# Patient Record
Sex: FEMALE | Race: BLACK OR AFRICAN AMERICAN | NOT HISPANIC OR LATINO | URBAN - METROPOLITAN AREA
[De-identification: names, ages, dates, MRNs, and addresses within clinical notes are randomized per-mention and may not be internally consistent; named-entity substitution may affect disease eponyms.]

---

## 2017-09-01 ENCOUNTER — EMERGENCY (EMERGENCY)
Facility: HOSPITAL | Age: 22
LOS: 1 days | Discharge: HOME | End: 2017-09-01
Attending: EMERGENCY MEDICINE | Admitting: EMERGENCY MEDICINE
Payer: COMMERCIAL

## 2017-09-01 VITALS
RESPIRATION RATE: 16 BRPM | OXYGEN SATURATION: 100 % | DIASTOLIC BLOOD PRESSURE: 79 MMHG | TEMPERATURE: 98 F | HEART RATE: 76 BPM | SYSTOLIC BLOOD PRESSURE: 129 MMHG

## 2017-09-01 PROCEDURE — 99284 EMERGENCY DEPT VISIT MOD MDM: CPT
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
## 2018-06-24 ENCOUNTER — EMERGENCY (EMERGENCY)
Facility: HOSPITAL | Age: 23
LOS: 1 days | End: 2018-06-24
Attending: EMERGENCY MEDICINE
Payer: COMMERCIAL

## 2018-06-24 ENCOUNTER — EMERGENCY (EMERGENCY)
Facility: HOSPITAL | Age: 23
LOS: 1 days | Discharge: ANOTHER HOSPITAL | End: 2018-06-24
Attending: EMERGENCY MEDICINE | Admitting: EMERGENCY MEDICINE
Payer: COMMERCIAL

## 2018-06-24 VITALS
DIASTOLIC BLOOD PRESSURE: 75 MMHG | SYSTOLIC BLOOD PRESSURE: 125 MMHG | HEART RATE: 83 BPM | OXYGEN SATURATION: 99 % | RESPIRATION RATE: 18 BRPM | TEMPERATURE: 99 F

## 2018-06-24 VITALS
OXYGEN SATURATION: 100 % | HEART RATE: 113 BPM | DIASTOLIC BLOOD PRESSURE: 90 MMHG | RESPIRATION RATE: 20 BRPM | TEMPERATURE: 97 F | SYSTOLIC BLOOD PRESSURE: 120 MMHG

## 2018-06-24 VITALS
SYSTOLIC BLOOD PRESSURE: 136 MMHG | HEART RATE: 92 BPM | DIASTOLIC BLOOD PRESSURE: 79 MMHG | OXYGEN SATURATION: 98 % | TEMPERATURE: 98 F | RESPIRATION RATE: 20 BRPM

## 2018-06-24 VITALS
TEMPERATURE: 99 F | SYSTOLIC BLOOD PRESSURE: 130 MMHG | HEART RATE: 104 BPM | OXYGEN SATURATION: 100 % | DIASTOLIC BLOOD PRESSURE: 79 MMHG | RESPIRATION RATE: 15 BRPM

## 2018-06-24 LAB
ALBUMIN SERPL ELPH-MCNC: 3.9 G/DL — SIGNIFICANT CHANGE UP (ref 3.3–5)
ALBUMIN SERPL ELPH-MCNC: 4.1 G/DL — SIGNIFICANT CHANGE UP (ref 3.3–5)
ALBUMIN SERPL ELPH-MCNC: 4.8 G/DL — SIGNIFICANT CHANGE UP (ref 3.3–5)
ALP SERPL-CCNC: 63 U/L — SIGNIFICANT CHANGE UP (ref 40–120)
ALP SERPL-CCNC: 64 U/L — SIGNIFICANT CHANGE UP (ref 40–120)
ALP SERPL-CCNC: 71 U/L — SIGNIFICANT CHANGE UP (ref 40–120)
ALT FLD-CCNC: 10 U/L — SIGNIFICANT CHANGE UP (ref 4–33)
ALT FLD-CCNC: 10 U/L — SIGNIFICANT CHANGE UP (ref 4–33)
ALT FLD-CCNC: 8 U/L — LOW (ref 10–45)
AMPHET UR-MCNC: NEGATIVE — SIGNIFICANT CHANGE UP
ANION GAP SERPL CALC-SCNC: 16 MMOL/L — SIGNIFICANT CHANGE UP (ref 5–17)
APAP SERPL-MCNC: < 15 UG/ML — LOW (ref 15–25)
APPEARANCE UR: CLEAR — SIGNIFICANT CHANGE UP
AST SERPL-CCNC: 11 U/L — SIGNIFICANT CHANGE UP (ref 10–40)
AST SERPL-CCNC: 15 U/L — SIGNIFICANT CHANGE UP (ref 4–32)
AST SERPL-CCNC: 16 U/L — SIGNIFICANT CHANGE UP (ref 4–32)
BARBITURATES UR SCN-MCNC: NEGATIVE — SIGNIFICANT CHANGE UP
BASE EXCESS BLDV CALC-SCNC: -0.3 MMOL/L — SIGNIFICANT CHANGE UP (ref -2–2)
BASE EXCESS BLDV CALC-SCNC: 0.4 MMOL/L — SIGNIFICANT CHANGE UP
BASE EXCESS BLDV CALC-SCNC: 2.1 MMOL/L — SIGNIFICANT CHANGE UP
BASOPHILS # BLD AUTO: 0.04 K/UL — SIGNIFICANT CHANGE UP (ref 0–0.2)
BASOPHILS NFR BLD AUTO: 0.2 % — SIGNIFICANT CHANGE UP (ref 0–2)
BENZODIAZ UR-MCNC: NEGATIVE — SIGNIFICANT CHANGE UP
BILIRUB SERPL-MCNC: 0.4 MG/DL — SIGNIFICANT CHANGE UP (ref 0.2–1.2)
BILIRUB SERPL-MCNC: 0.4 MG/DL — SIGNIFICANT CHANGE UP (ref 0.2–1.2)
BILIRUB SERPL-MCNC: 0.7 MG/DL — SIGNIFICANT CHANGE UP (ref 0.2–1.2)
BILIRUB UR-MCNC: NEGATIVE — SIGNIFICANT CHANGE UP
BLOOD GAS VENOUS - CREATININE: 0.52 MG/DL — SIGNIFICANT CHANGE UP (ref 0.5–1.3)
BLOOD GAS VENOUS - CREATININE: 0.54 MG/DL — SIGNIFICANT CHANGE UP (ref 0.5–1.3)
BLOOD UR QL VISUAL: NEGATIVE — SIGNIFICANT CHANGE UP
BUN SERPL-MCNC: 5 MG/DL — LOW (ref 7–23)
BUN SERPL-MCNC: 6 MG/DL — LOW (ref 7–23)
BUN SERPL-MCNC: 6 MG/DL — LOW (ref 7–23)
CA-I SERPL-SCNC: 1.1 MMOL/L — LOW (ref 1.12–1.3)
CALCIUM SERPL-MCNC: 8.3 MG/DL — LOW (ref 8.4–10.5)
CALCIUM SERPL-MCNC: 8.4 MG/DL — SIGNIFICANT CHANGE UP (ref 8.4–10.5)
CALCIUM SERPL-MCNC: 9.7 MG/DL — SIGNIFICANT CHANGE UP (ref 8.4–10.5)
CANNABINOIDS UR-MCNC: POSITIVE — SIGNIFICANT CHANGE UP
CHLORIDE BLDV-SCNC: 109 MMOL/L — HIGH (ref 96–108)
CHLORIDE BLDV-SCNC: 111 MMOL/L — HIGH (ref 96–108)
CHLORIDE BLDV-SCNC: 112 MMOL/L — HIGH (ref 96–108)
CHLORIDE SERPL-SCNC: 103 MMOL/L — SIGNIFICANT CHANGE UP (ref 98–107)
CHLORIDE SERPL-SCNC: 106 MMOL/L — SIGNIFICANT CHANGE UP (ref 98–107)
CHLORIDE SERPL-SCNC: 107 MMOL/L — SIGNIFICANT CHANGE UP (ref 96–108)
CO2 BLDV-SCNC: 26 MMOL/L — SIGNIFICANT CHANGE UP (ref 22–30)
CO2 SERPL-SCNC: 20 MMOL/L — LOW (ref 22–31)
CO2 SERPL-SCNC: 21 MMOL/L — LOW (ref 22–31)
CO2 SERPL-SCNC: 23 MMOL/L — SIGNIFICANT CHANGE UP (ref 22–31)
COCAINE METAB.OTHER UR-MCNC: NEGATIVE — SIGNIFICANT CHANGE UP
COLOR SPEC: YELLOW — SIGNIFICANT CHANGE UP
CREAT SERPL-MCNC: 0.52 MG/DL — SIGNIFICANT CHANGE UP (ref 0.5–1.3)
CREAT SERPL-MCNC: 0.53 MG/DL — SIGNIFICANT CHANGE UP (ref 0.5–1.3)
CREAT SERPL-MCNC: 0.54 MG/DL — SIGNIFICANT CHANGE UP (ref 0.5–1.3)
EOSINOPHIL # BLD AUTO: 0 K/UL — SIGNIFICANT CHANGE UP (ref 0–0.5)
EOSINOPHIL NFR BLD AUTO: 0 % — SIGNIFICANT CHANGE UP (ref 0–6)
ETHANOL BLD-MCNC: 54 MG/DL — HIGH
GAS PNL BLDV: 139 MMOL/L — SIGNIFICANT CHANGE UP (ref 136–145)
GAS PNL BLDV: 140 MMOL/L — SIGNIFICANT CHANGE UP (ref 136–146)
GAS PNL BLDV: 146 MMOL/L — SIGNIFICANT CHANGE UP (ref 136–146)
GAS PNL BLDV: SIGNIFICANT CHANGE UP
GAS PNL BLDV: SIGNIFICANT CHANGE UP
GLUCOSE BLDV-MCNC: 109 — HIGH (ref 70–99)
GLUCOSE BLDV-MCNC: 116 — HIGH (ref 70–99)
GLUCOSE BLDV-MCNC: 90 MG/DL — SIGNIFICANT CHANGE UP (ref 70–99)
GLUCOSE SERPL-MCNC: 109 MG/DL — HIGH (ref 70–99)
GLUCOSE SERPL-MCNC: 117 MG/DL — HIGH (ref 70–99)
GLUCOSE SERPL-MCNC: 97 MG/DL — SIGNIFICANT CHANGE UP (ref 70–99)
GLUCOSE UR-MCNC: NEGATIVE — SIGNIFICANT CHANGE UP
HBV SURFACE AG SER-ACNC: NEGATIVE — SIGNIFICANT CHANGE UP
HCG SERPL-ACNC: < 5 MIU/ML — SIGNIFICANT CHANGE UP
HCO3 BLDV-SCNC: 23 MMOL/L — SIGNIFICANT CHANGE UP (ref 20–27)
HCO3 BLDV-SCNC: 25 MMOL/L — SIGNIFICANT CHANGE UP (ref 20–27)
HCO3 BLDV-SCNC: 25 MMOL/L — SIGNIFICANT CHANGE UP (ref 21–29)
HCT VFR BLD CALC: 36.2 % — SIGNIFICANT CHANGE UP (ref 34.5–45)
HCT VFR BLD CALC: 39.7 % — SIGNIFICANT CHANGE UP (ref 34.5–45)
HCT VFR BLDA CALC: 36 % — LOW (ref 39–50)
HCT VFR BLDV CALC: 39.3 % — SIGNIFICANT CHANGE UP (ref 34.5–45)
HCT VFR BLDV CALC: 57 % — CRITICAL HIGH (ref 34.5–45)
HGB BLD CALC-MCNC: 11.8 G/DL — SIGNIFICANT CHANGE UP (ref 11.5–15.5)
HGB BLD-MCNC: 12.3 G/DL — SIGNIFICANT CHANGE UP (ref 11.5–15.5)
HGB BLD-MCNC: 13.7 G/DL — SIGNIFICANT CHANGE UP (ref 11.5–15.5)
HGB BLDV-MCNC: 12.8 G/DL — SIGNIFICANT CHANGE UP (ref 11.5–15.5)
HGB BLDV-MCNC: 18.7 G/DL — HIGH (ref 11.5–15.5)
HIV COMBO RESULT: SIGNIFICANT CHANGE UP
HIV1+2 AB SPEC QL: SIGNIFICANT CHANGE UP
IMM GRANULOCYTES # BLD AUTO: 0.13 # — SIGNIFICANT CHANGE UP
IMM GRANULOCYTES NFR BLD AUTO: 0.6 % — SIGNIFICANT CHANGE UP (ref 0–1.5)
KETONES UR-MCNC: SIGNIFICANT CHANGE UP
LACTATE BLDV-MCNC: 1.7 MMOL/L — SIGNIFICANT CHANGE UP (ref 0.5–2)
LACTATE BLDV-MCNC: 1.7 MMOL/L — SIGNIFICANT CHANGE UP (ref 0.7–2)
LACTATE BLDV-MCNC: 4.7 MMOL/L — CRITICAL HIGH (ref 0.5–2)
LEUKOCYTE ESTERASE UR-ACNC: NEGATIVE — SIGNIFICANT CHANGE UP
LIDOCAIN IGE QN: 21.8 U/L — SIGNIFICANT CHANGE UP (ref 7–60)
LIDOCAIN IGE QN: 33.6 U/L — SIGNIFICANT CHANGE UP (ref 7–60)
LYMPHOCYTES # BLD AUTO: 1.76 K/UL — SIGNIFICANT CHANGE UP (ref 1–3.3)
LYMPHOCYTES # BLD AUTO: 7.9 % — LOW (ref 13–44)
MCHC RBC-ENTMCNC: 29.5 PG — SIGNIFICANT CHANGE UP (ref 27–34)
MCHC RBC-ENTMCNC: 31.1 PG — SIGNIFICANT CHANGE UP (ref 27–34)
MCHC RBC-ENTMCNC: 34 GM/DL — SIGNIFICANT CHANGE UP (ref 32–36)
MCHC RBC-ENTMCNC: 34.5 % — SIGNIFICANT CHANGE UP (ref 32–36)
MCV RBC AUTO: 85.6 FL — SIGNIFICANT CHANGE UP (ref 80–100)
MCV RBC AUTO: 91.5 FL — SIGNIFICANT CHANGE UP (ref 80–100)
METHADONE UR-MCNC: NEGATIVE — SIGNIFICANT CHANGE UP
MONOCYTES # BLD AUTO: 0.65 K/UL — SIGNIFICANT CHANGE UP (ref 0–0.9)
MONOCYTES NFR BLD AUTO: 2.9 % — SIGNIFICANT CHANGE UP (ref 2–14)
MUCOUS THREADS # UR AUTO: SIGNIFICANT CHANGE UP
NEUTROPHILS # BLD AUTO: 19.76 K/UL — HIGH (ref 1.8–7.4)
NEUTROPHILS NFR BLD AUTO: 88.4 % — HIGH (ref 43–77)
NITRITE UR-MCNC: NEGATIVE — SIGNIFICANT CHANGE UP
NRBC # FLD: 0 — SIGNIFICANT CHANGE UP
OPIATES UR-MCNC: NEGATIVE — SIGNIFICANT CHANGE UP
OXYCODONE UR-MCNC: NEGATIVE — SIGNIFICANT CHANGE UP
PCO2 BLDV: 38 MMHG — LOW (ref 41–51)
PCO2 BLDV: 44 MMHG — SIGNIFICANT CHANGE UP (ref 35–50)
PCO2 BLDV: 46 MMHG — SIGNIFICANT CHANGE UP (ref 41–51)
PCP UR-MCNC: NEGATIVE — SIGNIFICANT CHANGE UP
PH BLDV: 7.36 PH — SIGNIFICANT CHANGE UP (ref 7.32–7.43)
PH BLDV: 7.36 — SIGNIFICANT CHANGE UP (ref 7.35–7.45)
PH BLDV: 7.45 PH — HIGH (ref 7.32–7.43)
PH UR: 8 — SIGNIFICANT CHANGE UP (ref 4.6–8)
PLATELET # BLD AUTO: 374 K/UL — SIGNIFICANT CHANGE UP (ref 150–400)
PLATELET # BLD AUTO: 484 K/UL — HIGH (ref 150–400)
PMV BLD: 8.9 FL — SIGNIFICANT CHANGE UP (ref 7–13)
PO2 BLDV: 25 MMHG — LOW (ref 35–40)
PO2 BLDV: 28 MMHG — LOW (ref 35–40)
PO2 BLDV: 42 MMHG — SIGNIFICANT CHANGE UP (ref 25–45)
POTASSIUM BLDV-SCNC: 3.1 MMOL/L — LOW (ref 3.4–4.5)
POTASSIUM BLDV-SCNC: 3.3 MMOL/L — LOW (ref 3.5–5.3)
POTASSIUM BLDV-SCNC: 3.7 MMOL/L — SIGNIFICANT CHANGE UP (ref 3.4–4.5)
POTASSIUM SERPL-MCNC: 3.2 MMOL/L — LOW (ref 3.5–5.3)
POTASSIUM SERPL-MCNC: 3.5 MMOL/L — SIGNIFICANT CHANGE UP (ref 3.5–5.3)
POTASSIUM SERPL-MCNC: 3.8 MMOL/L — SIGNIFICANT CHANGE UP (ref 3.5–5.3)
POTASSIUM SERPL-SCNC: 3.2 MMOL/L — LOW (ref 3.5–5.3)
POTASSIUM SERPL-SCNC: 3.5 MMOL/L — SIGNIFICANT CHANGE UP (ref 3.5–5.3)
POTASSIUM SERPL-SCNC: 3.8 MMOL/L — SIGNIFICANT CHANGE UP (ref 3.5–5.3)
PROT SERPL-MCNC: 6.8 G/DL — SIGNIFICANT CHANGE UP (ref 6–8.3)
PROT SERPL-MCNC: 7.4 G/DL — SIGNIFICANT CHANGE UP (ref 6–8.3)
PROT SERPL-MCNC: 8.5 G/DL — HIGH (ref 6–8.3)
PROT UR-MCNC: 100 MG/DL — HIGH
RBC # BLD: 3.96 M/UL — SIGNIFICANT CHANGE UP (ref 3.8–5.2)
RBC # BLD: 4.64 M/UL — SIGNIFICANT CHANGE UP (ref 3.8–5.2)
RBC # FLD: 11.6 % — SIGNIFICANT CHANGE UP (ref 10.3–14.5)
RBC # FLD: 12.8 % — SIGNIFICANT CHANGE UP (ref 10.3–14.5)
RBC CASTS # UR COMP ASSIST: SIGNIFICANT CHANGE UP (ref 0–?)
SALICYLATES SERPL-MCNC: < 5 MG/DL — LOW (ref 15–30)
SAO2 % BLDV: 37.9 % — LOW (ref 60–85)
SAO2 % BLDV: 42.2 % — LOW (ref 60–85)
SAO2 % BLDV: 70 % — SIGNIFICANT CHANGE UP (ref 67–88)
SODIUM SERPL-SCNC: 142 MMOL/L — SIGNIFICANT CHANGE UP (ref 135–145)
SODIUM SERPL-SCNC: 143 MMOL/L — SIGNIFICANT CHANGE UP (ref 135–145)
SODIUM SERPL-SCNC: 146 MMOL/L — HIGH (ref 135–145)
SP GR SPEC: 1.03 — SIGNIFICANT CHANGE UP (ref 1–1.04)
SQUAMOUS # UR AUTO: SIGNIFICANT CHANGE UP
UROBILINOGEN FLD QL: NORMAL MG/DL — SIGNIFICANT CHANGE UP
WBC # BLD: 22.34 K/UL — HIGH (ref 3.8–10.5)
WBC # BLD: 22.8 K/UL — HIGH (ref 3.8–10.5)
WBC # FLD AUTO: 22.34 K/UL — HIGH (ref 3.8–10.5)
WBC # FLD AUTO: 22.8 K/UL — HIGH (ref 3.8–10.5)
WBC UR QL: SIGNIFICANT CHANGE UP (ref 0–?)

## 2018-06-24 PROCEDURE — 99285 EMERGENCY DEPT VISIT HI MDM: CPT

## 2018-06-24 PROCEDURE — 99284 EMERGENCY DEPT VISIT MOD MDM: CPT

## 2018-06-24 PROCEDURE — 70486 CT MAXILLOFACIAL W/O DYE: CPT | Mod: 26

## 2018-06-24 PROCEDURE — 70450 CT HEAD/BRAIN W/O DYE: CPT | Mod: 26

## 2018-06-24 RX ORDER — EMTRICITABINE AND TENOFOVIR DISOPROXIL FUMARATE 200; 300 MG/1; MG/1
1 TABLET, FILM COATED ORAL ONCE
Qty: 0 | Refills: 0 | Status: COMPLETED | OUTPATIENT
Start: 2018-06-24 | End: 2018-06-24

## 2018-06-24 RX ORDER — CEFTRIAXONE 500 MG/1
250 INJECTION, POWDER, FOR SOLUTION INTRAMUSCULAR; INTRAVENOUS ONCE
Qty: 0 | Refills: 0 | Status: COMPLETED | OUTPATIENT
Start: 2018-06-24 | End: 2018-06-24

## 2018-06-24 RX ORDER — FAMOTIDINE 10 MG/ML
20 INJECTION INTRAVENOUS ONCE
Qty: 0 | Refills: 0 | Status: COMPLETED | OUTPATIENT
Start: 2018-06-24 | End: 2018-06-24

## 2018-06-24 RX ORDER — SODIUM CHLORIDE 9 MG/ML
2000 INJECTION INTRAMUSCULAR; INTRAVENOUS; SUBCUTANEOUS ONCE
Qty: 0 | Refills: 0 | Status: COMPLETED | OUTPATIENT
Start: 2018-06-24 | End: 2018-06-24

## 2018-06-24 RX ORDER — ACETAMINOPHEN 500 MG
1000 TABLET ORAL ONCE
Qty: 0 | Refills: 0 | Status: COMPLETED | OUTPATIENT
Start: 2018-06-24 | End: 2018-06-24

## 2018-06-24 RX ORDER — RALTEGRAVIR 400 MG/1
400 TABLET, FILM COATED ORAL ONCE
Qty: 0 | Refills: 0 | Status: COMPLETED | OUTPATIENT
Start: 2018-06-24 | End: 2018-06-24

## 2018-06-24 RX ORDER — ONDANSETRON 8 MG/1
4 TABLET, FILM COATED ORAL ONCE
Qty: 0 | Refills: 0 | Status: COMPLETED | OUTPATIENT
Start: 2018-06-24 | End: 2018-06-24

## 2018-06-24 RX ORDER — METRONIDAZOLE 500 MG
500 TABLET ORAL ONCE
Qty: 0 | Refills: 0 | Status: COMPLETED | OUTPATIENT
Start: 2018-06-24 | End: 2018-06-24

## 2018-06-24 RX ORDER — KETOROLAC TROMETHAMINE 30 MG/ML
15 SYRINGE (ML) INJECTION ONCE
Qty: 0 | Refills: 0 | Status: DISCONTINUED | OUTPATIENT
Start: 2018-06-24 | End: 2018-06-24

## 2018-06-24 RX ORDER — METRONIDAZOLE 500 MG
1500 TABLET ORAL ONCE
Qty: 0 | Refills: 0 | Status: DISCONTINUED | OUTPATIENT
Start: 2018-06-24 | End: 2018-06-28

## 2018-06-24 RX ORDER — SODIUM CHLORIDE 9 MG/ML
1000 INJECTION INTRAMUSCULAR; INTRAVENOUS; SUBCUTANEOUS ONCE
Qty: 0 | Refills: 0 | Status: COMPLETED | OUTPATIENT
Start: 2018-06-24 | End: 2018-06-24

## 2018-06-24 RX ORDER — TETANUS TOXOID, REDUCED DIPHTHERIA TOXOID AND ACELLULAR PERTUSSIS VACCINE, ADSORBED 5; 2.5; 8; 8; 2.5 [IU]/.5ML; [IU]/.5ML; UG/.5ML; UG/.5ML; UG/.5ML
0.5 SUSPENSION INTRAMUSCULAR ONCE
Qty: 0 | Refills: 0 | Status: COMPLETED | OUTPATIENT
Start: 2018-06-24 | End: 2018-06-24

## 2018-06-24 RX ADMIN — FAMOTIDINE 20 MILLIGRAM(S): 10 INJECTION INTRAVENOUS at 15:53

## 2018-06-24 RX ADMIN — Medication 400 MILLIGRAM(S): at 17:21

## 2018-06-24 RX ADMIN — CEFTRIAXONE 250 MILLIGRAM(S): 500 INJECTION, POWDER, FOR SOLUTION INTRAMUSCULAR; INTRAVENOUS at 18:33

## 2018-06-24 RX ADMIN — Medication 100 MILLIGRAM(S): at 18:34

## 2018-06-24 RX ADMIN — ONDANSETRON 4 MILLIGRAM(S): 8 TABLET, FILM COATED ORAL at 15:53

## 2018-06-24 RX ADMIN — TETANUS TOXOID, REDUCED DIPHTHERIA TOXOID AND ACELLULAR PERTUSSIS VACCINE, ADSORBED 0.5 MILLILITER(S): 5; 2.5; 8; 8; 2.5 SUSPENSION INTRAMUSCULAR at 18:33

## 2018-06-24 RX ADMIN — RALTEGRAVIR 400 MILLIGRAM(S): 400 TABLET, FILM COATED ORAL at 18:35

## 2018-06-24 RX ADMIN — SODIUM CHLORIDE 2000 MILLILITER(S): 9 INJECTION INTRAMUSCULAR; INTRAVENOUS; SUBCUTANEOUS at 18:35

## 2018-06-24 RX ADMIN — Medication 500 MILLIGRAM(S): at 18:35

## 2018-06-24 RX ADMIN — Medication 1000 MILLIGRAM(S): at 17:30

## 2018-06-24 RX ADMIN — SODIUM CHLORIDE 1000 MILLILITER(S): 9 INJECTION INTRAMUSCULAR; INTRAVENOUS; SUBCUTANEOUS at 15:53

## 2018-06-24 RX ADMIN — EMTRICITABINE AND TENOFOVIR DISOPROXIL FUMARATE 1 TABLET(S): 200; 300 TABLET, FILM COATED ORAL at 18:35
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
## 2018-06-24 NOTE — ED ADULT NURSE REASSESSMENT NOTE - NS ED NURSE REASSESS COMMENT FT1
Report given to PENG Mcmanus and charge RN in the ED at [facility]. Pt awaiting transport to [facility]. Will continue to monitor.
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
## 2018-06-24 NOTE — ED PROVIDER NOTE - SKIN COLOR
R lateral periorbital hematoma with swelling and overlying abrasion. superficial abrasion overlying right knee/normal for race
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
## 2018-06-24 NOTE — ED ADULT NURSE REASSESSMENT NOTE - NS ED NURSE REASSESS COMMENT FT1
Pt A&Ax3, breathing unlabored, pain controlled at this time. Pt given 7day PEP HIV medication and instructions. First dose administered here in ED. Awaiting transfer to [facility].
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
## 2018-06-24 NOTE — ED PROVIDER NOTE - LOCATION
hematoma/abrasion at lateral right eye. Abrasion to upper lip and R knee. R front tooth missing. Generalized headache and epigastric discomfort, n/v
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
## 2018-06-25 LAB
C TRACH RRNA SPEC QL NAA+PROBE: SIGNIFICANT CHANGE UP
HAV IGM SER-ACNC: NONREACTIVE — SIGNIFICANT CHANGE UP
HBV CORE IGM SER-ACNC: NONREACTIVE — SIGNIFICANT CHANGE UP
HBV SURFACE AB SER-ACNC: REACTIVE — SIGNIFICANT CHANGE UP
HBV SURFACE AG SER-ACNC: NONREACTIVE — SIGNIFICANT CHANGE UP
HCV AB S/CO SERPL IA: 0.13 S/CO — SIGNIFICANT CHANGE UP
HCV AB SERPL-IMP: SIGNIFICANT CHANGE UP
N GONORRHOEA RRNA SPEC QL NAA+PROBE: SIGNIFICANT CHANGE UP
SPECIMEN SOURCE: SIGNIFICANT CHANGE UP
T PALLIDUM AB TITR SER: NEGATIVE — SIGNIFICANT CHANGE UP

## 2018-06-25 PROCEDURE — 82803 BLOOD GASES ANY COMBINATION: CPT

## 2018-06-25 PROCEDURE — 96374 THER/PROPH/DIAG INJ IV PUSH: CPT

## 2018-06-25 PROCEDURE — 80053 COMPREHEN METABOLIC PANEL: CPT

## 2018-06-25 PROCEDURE — 82435 ASSAY OF BLOOD CHLORIDE: CPT

## 2018-06-25 PROCEDURE — 82330 ASSAY OF CALCIUM: CPT

## 2018-06-25 PROCEDURE — 84132 ASSAY OF SERUM POTASSIUM: CPT

## 2018-06-25 PROCEDURE — 82947 ASSAY GLUCOSE BLOOD QUANT: CPT

## 2018-06-25 PROCEDURE — 99284 EMERGENCY DEPT VISIT MOD MDM: CPT | Mod: 25

## 2018-06-25 PROCEDURE — 85027 COMPLETE CBC AUTOMATED: CPT

## 2018-06-25 PROCEDURE — 85014 HEMATOCRIT: CPT

## 2018-06-25 PROCEDURE — 84295 ASSAY OF SERUM SODIUM: CPT

## 2018-06-25 PROCEDURE — 83605 ASSAY OF LACTIC ACID: CPT

## 2018-06-25 RX ADMIN — Medication 15 MILLIGRAM(S): at 00:38

## 2018-07-02 ENCOUNTER — APPOINTMENT (OUTPATIENT)
Dept: PEDIATRIC ALLERGY IMMUNOLOGY | Facility: CLINIC | Age: 23
End: 2018-07-02
Payer: COMMERCIAL

## 2018-07-02 VITALS
DIASTOLIC BLOOD PRESSURE: 80 MMHG | HEART RATE: 76 BPM | HEIGHT: 59 IN | OXYGEN SATURATION: 99 % | BODY MASS INDEX: 23.21 KG/M2 | SYSTOLIC BLOOD PRESSURE: 120 MMHG | WEIGHT: 115.13 LBS

## 2018-07-02 DIAGNOSIS — Z82.49 FAMILY HISTORY OF ISCHEMIC HEART DISEASE AND OTHER DISEASES OF THE CIRCULATORY SYSTEM: ICD-10-CM

## 2018-07-02 DIAGNOSIS — Z82.5 FAMILY HISTORY OF ASTHMA AND OTHER CHRONIC LOWER RESPIRATORY DISEASES: ICD-10-CM

## 2018-07-02 DIAGNOSIS — Z82.69 FAMILY HISTORY OF OTHER DISEASES OF THE MUSCULOSKELETAL SYSTEM AND CONNECTIVE TISSUE: ICD-10-CM

## 2018-07-02 DIAGNOSIS — Z84.89 FAMILY HISTORY OF OTHER SPECIFIED CONDITIONS: ICD-10-CM

## 2018-07-02 DIAGNOSIS — Z84.2 FAMILY HISTORY OF OTHER DISEASES OF THE GENITOURINARY SYSTEM: ICD-10-CM

## 2018-07-02 PROCEDURE — 99205 OFFICE O/P NEW HI 60 MIN: CPT

## 2018-08-08 ENCOUNTER — APPOINTMENT (OUTPATIENT)
Dept: OBGYN | Facility: CLINIC | Age: 23
End: 2018-08-08
Payer: COMMERCIAL

## 2018-08-08 VITALS
HEIGHT: 59 IN | BODY MASS INDEX: 22.98 KG/M2 | SYSTOLIC BLOOD PRESSURE: 117 MMHG | WEIGHT: 114 LBS | DIASTOLIC BLOOD PRESSURE: 77 MMHG

## 2018-08-08 PROCEDURE — 99395 PREV VISIT EST AGE 18-39: CPT

## 2018-08-10 LAB
C TRACH RRNA SPEC QL NAA+PROBE: NOT DETECTED
CANDIDA VAG CYTO: DETECTED
G VAGINALIS+PREV SP MTYP VAG QL MICRO: DETECTED
N GONORRHOEA RRNA SPEC QL NAA+PROBE: NOT DETECTED
SOURCE AMPLIFICATION: NORMAL
T VAGINALIS VAG QL WET PREP: NOT DETECTED

## 2018-08-14 LAB — CYTOLOGY CVX/VAG DOC THIN PREP: NORMAL

## 2018-12-11 ENCOUNTER — APPOINTMENT (OUTPATIENT)
Dept: OBGYN | Facility: CLINIC | Age: 23
End: 2018-12-11
Payer: COMMERCIAL

## 2018-12-11 VITALS
SYSTOLIC BLOOD PRESSURE: 108 MMHG | DIASTOLIC BLOOD PRESSURE: 70 MMHG | WEIGHT: 114 LBS | HEIGHT: 59 IN | BODY MASS INDEX: 22.98 KG/M2

## 2018-12-11 DIAGNOSIS — N76.1 SUBACUTE AND CHRONIC VAGINITIS: ICD-10-CM

## 2018-12-11 PROCEDURE — 99214 OFFICE O/P EST MOD 30 MIN: CPT

## 2018-12-11 RX ORDER — FLUCONAZOLE 150 MG/1
150 TABLET ORAL
Qty: 1 | Refills: 1 | Status: COMPLETED | COMMUNITY
Start: 2018-08-10 | End: 2018-12-11

## 2018-12-11 RX ORDER — RALTEGRAVIR 400 MG/1
400 TABLET, FILM COATED ORAL TWICE DAILY
Qty: 42 | Refills: 0 | Status: COMPLETED | COMMUNITY
Start: 2018-07-02 | End: 2018-12-11

## 2018-12-11 RX ORDER — EMTRICITABINE AND TENOFOVIR DISOPROXIL FUMARATE 200; 300 MG/1; MG/1
200-300 TABLET, FILM COATED ORAL
Qty: 1 | Refills: 0 | Status: COMPLETED | COMMUNITY
Start: 2018-07-02 | End: 2018-12-11

## 2018-12-16 LAB
C TRACH RRNA SPEC QL NAA+PROBE: DETECTED
CYTOLOGY CVX/VAG DOC THIN PREP: NORMAL
N GONORRHOEA RRNA SPEC QL NAA+PROBE: NOT DETECTED
SOURCE AMPLIFICATION: NORMAL

## 2018-12-18 ENCOUNTER — CHART COPY (OUTPATIENT)
Age: 23
End: 2018-12-18

## 2018-12-18 LAB
HBV SURFACE AG SER QL: NONREACTIVE
HCV AB SER QL: NONREACTIVE
HCV S/CO RATIO: 0.1 S/CO
HIV1+2 AB SPEC QL IA.RAPID: NONREACTIVE
T PALLIDUM AB SER QL IA: NEGATIVE

## 2019-01-08 ENCOUNTER — APPOINTMENT (OUTPATIENT)
Dept: OBGYN | Facility: CLINIC | Age: 24
End: 2019-01-08
Payer: COMMERCIAL

## 2019-01-08 VITALS
BODY MASS INDEX: 22.6 KG/M2 | WEIGHT: 112.13 LBS | SYSTOLIC BLOOD PRESSURE: 123 MMHG | HEIGHT: 59 IN | HEART RATE: 92 BPM | DIASTOLIC BLOOD PRESSURE: 78 MMHG

## 2019-01-08 DIAGNOSIS — A56.09 CHLAMYDIAL VULVOVAGINITIS: ICD-10-CM

## 2019-01-08 DIAGNOSIS — A56.02 CHLAMYDIAL VULVOVAGINITIS: ICD-10-CM

## 2019-01-08 PROCEDURE — 11981 INSERTION DRUG DLVR IMPLANT: CPT

## 2019-01-08 PROCEDURE — 99212 OFFICE O/P EST SF 10 MIN: CPT

## 2019-01-08 RX ORDER — AZITHROMYCIN 500 MG/1
500 TABLET, FILM COATED ORAL
Qty: 2 | Refills: 1 | Status: COMPLETED | COMMUNITY
Start: 2018-12-13 | End: 2019-01-08

## 2019-01-09 LAB
C TRACH RRNA SPEC QL NAA+PROBE: NOT DETECTED
N GONORRHOEA RRNA SPEC QL NAA+PROBE: NOT DETECTED
SOURCE AMPLIFICATION: NORMAL

## 2019-02-12 ENCOUNTER — APPOINTMENT (OUTPATIENT)
Dept: OBGYN | Facility: CLINIC | Age: 24
End: 2019-02-12
Payer: COMMERCIAL

## 2019-02-12 VITALS
WEIGHT: 113.38 LBS | HEIGHT: 59 IN | SYSTOLIC BLOOD PRESSURE: 90 MMHG | BODY MASS INDEX: 22.86 KG/M2 | DIASTOLIC BLOOD PRESSURE: 70 MMHG

## 2019-02-12 PROCEDURE — 99213 OFFICE O/P EST LOW 20 MIN: CPT

## 2019-02-12 NOTE — COUNSELING
[STD (testing, results, tx)] : STD (testing, results, tx) [Contraception] : contraception [Safe Sexual Practices] : safe sexual practices

## 2019-02-12 NOTE — PHYSICAL EXAM
[Bluish Discoloration] : did not have a bluish discoloration [Awake] : awake [Alert] : alert [Soft] : soft [Oriented x3] : oriented to person, place, and time [Normal] : uterus [No Bleeding] : there was no active vaginal bleeding [Uterine Adnexae] : were not tender and not enlarged [Acute Distress] : no acute distress [Tender] : non tender [Discharge] : a  ~M vaginal discharge was present [Moderate] : moderate [Shahla] : yellow

## 2019-02-13 ENCOUNTER — TRANSCRIPTION ENCOUNTER (OUTPATIENT)
Age: 24
End: 2019-02-13

## 2019-02-14 ENCOUNTER — OTHER (OUTPATIENT)
Age: 24
End: 2019-02-14

## 2019-02-19 LAB
CANDIDA VAG CYTO: NOT DETECTED
G VAGINALIS+PREV SP MTYP VAG QL MICRO: DETECTED
T VAGINALIS VAG QL WET PREP: NOT DETECTED

## 2019-03-01 ENCOUNTER — TRANSCRIPTION ENCOUNTER (OUTPATIENT)
Age: 24
End: 2019-03-01

## 2019-03-06 ENCOUNTER — TRANSCRIPTION ENCOUNTER (OUTPATIENT)
Age: 24
End: 2019-03-06

## 2019-03-07 ENCOUNTER — APPOINTMENT (OUTPATIENT)
Dept: OBGYN | Facility: CLINIC | Age: 24
End: 2019-03-07
Payer: COMMERCIAL

## 2019-03-07 VITALS
DIASTOLIC BLOOD PRESSURE: 70 MMHG | SYSTOLIC BLOOD PRESSURE: 100 MMHG | BODY MASS INDEX: 22.6 KG/M2 | HEIGHT: 59 IN | WEIGHT: 112.13 LBS

## 2019-03-07 PROCEDURE — 99214 OFFICE O/P EST MOD 30 MIN: CPT

## 2019-03-07 NOTE — PHYSICAL EXAM
[Awake] : awake [Alert] : alert [Acute Distress] : no acute distress [Soft] : soft [Tender] : non tender [Oriented x3] : oriented to person, place, and time [Normal] : uterus [Discharge] : a  ~M vaginal discharge was present [Scant] : there was scant vaginal bleeding [Uterine Adnexae] : were not tender and not enlarged [FreeTextEntry4] : small white discharge adherent to side wall

## 2021-01-19 ENCOUNTER — TRANSCRIPTION ENCOUNTER (OUTPATIENT)
Age: 26
End: 2021-01-19

## 2022-02-07 ENCOUNTER — NON-APPOINTMENT (OUTPATIENT)
Age: 27
End: 2022-02-07

## 2022-02-25 NOTE — ED PROVIDER NOTE - DISPOSITION TYPE
DISCHARGE
no diplopia/no photophobia/no lacrimation L/no lacrimation R/no blurred vision L/no blurred vision R/no discharge L/no discharge R/no pain L/no pain R/no irritation L/no irritation R/no scleral injection L/no scleral injection R

## 2022-03-10 ENCOUNTER — APPOINTMENT (OUTPATIENT)
Dept: OBGYN | Facility: CLINIC | Age: 27
End: 2022-03-10
Payer: COMMERCIAL

## 2022-03-10 ENCOUNTER — LABORATORY RESULT (OUTPATIENT)
Age: 27
End: 2022-03-10

## 2022-03-10 VITALS — SYSTOLIC BLOOD PRESSURE: 117 MMHG | DIASTOLIC BLOOD PRESSURE: 77 MMHG | BODY MASS INDEX: 23.03 KG/M2 | WEIGHT: 114 LBS

## 2022-03-10 PROCEDURE — 99395 PREV VISIT EST AGE 18-39: CPT

## 2022-03-10 RX ORDER — PRENATAL NO.167/FOLIC ACID/DHA 0.4MG-25MG
0.4-25 TABLET,CHEWABLE ORAL
Refills: 0 | Status: ACTIVE | COMMUNITY

## 2022-03-10 NOTE — HISTORY OF PRESENT ILLNESS
[FreeTextEntry1] : 25yo P0 LMP 1/2 here for annual exam and missed menses\par nausea and breast tenderness\par no pelvic pain though occ cramp\par \par new BF\par \par working at [employer] as manager

## 2022-03-10 NOTE — COUNSELING
[Nutrition/ Exercise/ Weight Management] : nutrition, exercise, weight management [Body Image] : body image [Vitamins/Supplements] : vitamins/supplements [Drugs/Alcohol] : drugs, alcohol [STD (testing, results, tx)] : STD (testing, results, tx) [Vaccines] : vaccines [Influenza Vaccine] : influenza vaccine

## 2022-03-10 NOTE — PLAN
[FreeTextEntry1] : well woman\par \par 9 week pregnancy, EDC  10/9\par \par f/u  pap\par \par discussed  risk of preg, vaccines, delivery, diet, activity\par \par PN labs\par \par discussed NIPT and DS screening\par STD screen\par \par s/p covide vaccine, discussed flu vaccination

## 2022-03-10 NOTE — PROCEDURE
[Transvaginal OB Sonogram] : Transvaginal OB Sonogram [Intrauterine Pregnancy] : intrauterine pregnancy [Fetal Heart] : fetal heart present [Current GA by Sonogram: ___ (wks)] : Current GA by Sonogram: [unfilled]Uwks [___ day(s)] : [unfilled] days [FreeTextEntry1] : EDC 10/9 by LMP and sono

## 2022-03-10 NOTE — PHYSICAL EXAM

## 2022-03-11 ENCOUNTER — NON-APPOINTMENT (OUTPATIENT)
Age: 27
End: 2022-03-11

## 2022-03-11 LAB
ABO + RH PNL BLD: NORMAL
ALBUMIN SERPL ELPH-MCNC: 3.7 G/DL
ALP BLD-CCNC: 60 U/L
ALT SERPL-CCNC: 37 U/L
ANION GAP SERPL CALC-SCNC: 14 MMOL/L
AST SERPL-CCNC: 23 U/L
BASOPHILS # BLD AUTO: 0.05 K/UL
BASOPHILS NFR BLD AUTO: 0.4 %
BILIRUB SERPL-MCNC: 0.2 MG/DL
BLD GP AB SCN SERPL QL: NORMAL
BUN SERPL-MCNC: 8 MG/DL
C TRACH RRNA SPEC QL NAA+PROBE: NOT DETECTED
CALCIUM SERPL-MCNC: 9.2 MG/DL
CHLORIDE SERPL-SCNC: 102 MMOL/L
CMV IGG SERPL QL: 3.5 U/ML
CMV IGG SERPL-IMP: POSITIVE
CO2 SERPL-SCNC: 19 MMOL/L
CREAT SERPL-MCNC: 0.56 MG/DL
EGFR: 127 ML/MIN/1.73M2
EOSINOPHIL # BLD AUTO: 0.27 K/UL
EOSINOPHIL NFR BLD AUTO: 2.1 %
ESTIMATED AVERAGE GLUCOSE: 91 MG/DL
FERRITIN SERPL-MCNC: 171 NG/ML
GLUCOSE SERPL-MCNC: 79 MG/DL
HBA1C MFR BLD HPLC: 4.8 %
HBV SURFACE AG SER QL: NONREACTIVE
HCT VFR BLD CALC: 37.6 %
HCV AB SER QL: NONREACTIVE
HCV S/CO RATIO: 0.08 S/CO
HGB A MFR BLD: 96.9 %
HGB A2 MFR BLD: 3.1 %
HGB BLD-MCNC: 12.1 G/DL
HGB FRACT BLD-IMP: NORMAL
HIV1+2 AB SPEC QL IA.RAPID: NONREACTIVE
IMM GRANULOCYTES NFR BLD AUTO: 0.4 %
LEAD BLD-MCNC: <1 UG/DL
LYMPHOCYTES # BLD AUTO: 2.37 K/UL
LYMPHOCYTES NFR BLD AUTO: 18.4 %
MAN DIFF?: NORMAL
MCHC RBC-ENTMCNC: 30 PG
MCHC RBC-ENTMCNC: 32.2 GM/DL
MCV RBC AUTO: 93.3 FL
MEV IGG FLD QL IA: <5 AU/ML
MEV IGG+IGM SER-IMP: NEGATIVE
MONOCYTES # BLD AUTO: 0.82 K/UL
MONOCYTES NFR BLD AUTO: 6.4 %
N GONORRHOEA RRNA SPEC QL NAA+PROBE: NOT DETECTED
NEUTROPHILS # BLD AUTO: 9.34 K/UL
NEUTROPHILS NFR BLD AUTO: 72.3 %
PLATELET # BLD AUTO: 420 K/UL
POTASSIUM SERPL-SCNC: 4.1 MMOL/L
PROT SERPL-MCNC: 6.4 G/DL
RBC # BLD: 4.03 M/UL
RBC # FLD: 12.8 %
RUBV IGG FLD-ACNC: 12.1 INDEX
RUBV IGG SER-IMP: POSITIVE
SODIUM SERPL-SCNC: 136 MMOL/L
SOURCE AMPLIFICATION: NORMAL
T PALLIDUM AB SER QL IA: NEGATIVE
TSH SERPL-ACNC: 0.67 UIU/ML
VZV AB TITR SER: POSITIVE
VZV IGG SER IF-ACNC: 902.4 INDEX
WBC # FLD AUTO: 12.9 K/UL

## 2022-03-11 RX ORDER — METRONIDAZOLE 7.5 MG/G
0.75 GEL VAGINAL
Qty: 1 | Refills: 2 | Status: COMPLETED | COMMUNITY
Start: 2018-08-10 | End: 2022-03-11

## 2022-03-12 LAB — BACTERIA UR CULT: NORMAL

## 2022-03-22 LAB
AR GENE MUT ANL BLD/T: NORMAL
B19V IGG SER QL IA: 0.66 INDEX
B19V IGG+IGM SER-IMP: NEGATIVE
B19V IGG+IGM SER-IMP: NORMAL
B19V IGM FLD-ACNC: 0.13 INDEX
B19V IGM SER-ACNC: NEGATIVE
CFTR MUT TESTED BLD/T: NEGATIVE
CYTOLOGY CVX/VAG DOC THIN PREP: NORMAL
FMR1 GENE MUT ANL BLD/T: NORMAL

## 2022-03-28 ENCOUNTER — APPOINTMENT (OUTPATIENT)
Dept: OBGYN | Facility: CLINIC | Age: 27
End: 2022-03-28
Payer: COMMERCIAL

## 2022-03-28 ENCOUNTER — ASOB RESULT (OUTPATIENT)
Age: 27
End: 2022-03-28

## 2022-03-28 ENCOUNTER — APPOINTMENT (OUTPATIENT)
Dept: ANTEPARTUM | Facility: CLINIC | Age: 27
End: 2022-03-28
Payer: COMMERCIAL

## 2022-03-28 VITALS
HEIGHT: 59 IN | BODY MASS INDEX: 23.84 KG/M2 | WEIGHT: 118.25 LBS | DIASTOLIC BLOOD PRESSURE: 79 MMHG | SYSTOLIC BLOOD PRESSURE: 116 MMHG

## 2022-03-28 DIAGNOSIS — Z30.09 ENCOUNTER FOR OTHER GENERAL COUNSELING AND ADVICE ON CONTRACEPTION: ICD-10-CM

## 2022-03-28 DIAGNOSIS — Z11.3 ENCOUNTER FOR SCREENING FOR INFECTIONS WITH A PREDOMINANTLY SEXUAL MODE OF TRANSMISSION: ICD-10-CM

## 2022-03-28 DIAGNOSIS — R87.615 UNSATISFACTORY CYTOLOGIC SMEAR OF CERVIX: ICD-10-CM

## 2022-03-28 DIAGNOSIS — Z29.9 ENCOUNTER FOR PROPHYLACTIC MEASURES, UNSPECIFIED: ICD-10-CM

## 2022-03-28 DIAGNOSIS — Z30.017 ENCOUNTER FOR INITIAL PRESCRIPTION OF IMPLANTABLE SUBDERMAL CONTRACEPTIVE: ICD-10-CM

## 2022-03-28 DIAGNOSIS — N92.6 IRREGULAR MENSTRUATION, UNSPECIFIED: ICD-10-CM

## 2022-03-28 PROCEDURE — 0501F PRENATAL FLOW SHEET: CPT

## 2022-03-28 PROCEDURE — 76801 OB US < 14 WKS SINGLE FETUS: CPT

## 2022-03-28 PROCEDURE — 76813 OB US NUCHAL MEAS 1 GEST: CPT | Mod: 59

## 2022-04-03 LAB
CLARI ADDITIONAL INFO: NORMAL
CLARI CHROMOSOME 13: NORMAL
CLARI CHROMOSOME 18: NORMAL
CLARI CHROMOSOME 21: NORMAL
CLARI SEX CHROMOSOMES: NORMAL
CLARITEST NIPT: NORMAL
MATERNAL WEIGHT (LBS):: NORMAL
PLEASE INCLUDE GENDER RESULTS ON THIS REPORT:: NORMAL
TYPE OF PREGNANCY:: NORMAL

## 2022-04-22 ENCOUNTER — NON-APPOINTMENT (OUTPATIENT)
Age: 27
End: 2022-04-22

## 2022-04-22 DIAGNOSIS — Z11.4 ENCOUNTER FOR SCREENING FOR HUMAN IMMUNODEFICIENCY VIRUS [HIV]: ICD-10-CM

## 2022-04-22 DIAGNOSIS — N89.8 OTHER SPECIFIED NONINFLAMMATORY DISORDERS OF VAGINA: ICD-10-CM

## 2022-04-25 ENCOUNTER — APPOINTMENT (OUTPATIENT)
Dept: OBGYN | Facility: CLINIC | Age: 27
End: 2022-04-25
Payer: COMMERCIAL

## 2022-04-25 ENCOUNTER — NON-APPOINTMENT (OUTPATIENT)
Age: 27
End: 2022-04-25

## 2022-04-25 VITALS
BODY MASS INDEX: 25.6 KG/M2 | SYSTOLIC BLOOD PRESSURE: 116 MMHG | WEIGHT: 127 LBS | DIASTOLIC BLOOD PRESSURE: 62 MMHG | HEIGHT: 59 IN

## 2022-04-25 DIAGNOSIS — Z34.91 ENCOUNTER FOR SUPERVISION OF NORMAL PREGNANCY, UNSPECIFIED, FIRST TRIMESTER: ICD-10-CM

## 2022-04-25 PROCEDURE — 0502F SUBSEQUENT PRENATAL CARE: CPT

## 2022-04-25 PROCEDURE — 81003 URINALYSIS AUTO W/O SCOPE: CPT | Mod: QW

## 2022-04-25 RX ORDER — SECNIDAZOLE 2 G/4.8G
2 GRANULE ORAL
Qty: 1 | Refills: 0 | Status: COMPLETED | COMMUNITY
Start: 2021-11-28

## 2022-05-02 ENCOUNTER — EMERGENCY (EMERGENCY)
Facility: HOSPITAL | Age: 27
LOS: 1 days | Discharge: HOME | End: 2022-05-02
Attending: EMERGENCY MEDICINE
Payer: COMMERCIAL

## 2022-05-02 ENCOUNTER — NON-APPOINTMENT (OUTPATIENT)
Age: 27
End: 2022-05-02

## 2022-05-02 VITALS
OXYGEN SATURATION: 100 % | RESPIRATION RATE: 18 BRPM | SYSTOLIC BLOOD PRESSURE: 132 MMHG | TEMPERATURE: 99 F | HEART RATE: 99 BPM | DIASTOLIC BLOOD PRESSURE: 77 MMHG

## 2022-05-02 VITALS
HEIGHT: 59 IN | HEART RATE: 135 BPM | OXYGEN SATURATION: 100 % | TEMPERATURE: 100 F | DIASTOLIC BLOOD PRESSURE: 72 MMHG | WEIGHT: 128.09 LBS | RESPIRATION RATE: 18 BRPM | SYSTOLIC BLOOD PRESSURE: 120 MMHG

## 2022-05-02 LAB
ALBUMIN SERPL ELPH-MCNC: 4 G/DL — SIGNIFICANT CHANGE UP (ref 3.3–5)
ALP SERPL-CCNC: 77 U/L — SIGNIFICANT CHANGE UP (ref 40–120)
ALT FLD-CCNC: 211 U/L — HIGH (ref 10–45)
ANION GAP SERPL CALC-SCNC: 11 MMOL/L — SIGNIFICANT CHANGE UP (ref 5–17)
APPEARANCE UR: CLEAR — SIGNIFICANT CHANGE UP
APTT BLD: 31.6 SEC — SIGNIFICANT CHANGE UP (ref 27.5–35.5)
AST SERPL-CCNC: 87 U/L — HIGH (ref 10–40)
BACTERIA # UR AUTO: NEGATIVE — SIGNIFICANT CHANGE UP
BASE EXCESS BLDV CALC-SCNC: 0.3 MMOL/L — SIGNIFICANT CHANGE UP (ref -2–2)
BASOPHILS # BLD AUTO: 0.03 K/UL — SIGNIFICANT CHANGE UP (ref 0–0.2)
BASOPHILS NFR BLD AUTO: 0.2 % — SIGNIFICANT CHANGE UP (ref 0–2)
BILIRUB SERPL-MCNC: 0.3 MG/DL — SIGNIFICANT CHANGE UP (ref 0.2–1.2)
BILIRUB UR-MCNC: NEGATIVE — SIGNIFICANT CHANGE UP
BLD GP AB SCN SERPL QL: NEGATIVE — SIGNIFICANT CHANGE UP
BUN SERPL-MCNC: 4 MG/DL — LOW (ref 7–23)
CA-I SERPL-SCNC: 1.3 MMOL/L — SIGNIFICANT CHANGE UP (ref 1.15–1.33)
CALCIUM SERPL-MCNC: 9.5 MG/DL — SIGNIFICANT CHANGE UP (ref 8.4–10.5)
CHLORIDE BLDV-SCNC: 102 MMOL/L — SIGNIFICANT CHANGE UP (ref 96–108)
CHLORIDE SERPL-SCNC: 102 MMOL/L — SIGNIFICANT CHANGE UP (ref 96–108)
CO2 BLDV-SCNC: 27 MMOL/L — HIGH (ref 22–26)
CO2 SERPL-SCNC: 22 MMOL/L — SIGNIFICANT CHANGE UP (ref 22–31)
COLOR SPEC: SIGNIFICANT CHANGE UP
CREAT SERPL-MCNC: 0.39 MG/DL — LOW (ref 0.5–1.3)
DIFF PNL FLD: NEGATIVE — SIGNIFICANT CHANGE UP
EGFR: 139 ML/MIN/1.73M2 — SIGNIFICANT CHANGE UP
EOSINOPHIL # BLD AUTO: 0.24 K/UL — SIGNIFICANT CHANGE UP (ref 0–0.5)
EOSINOPHIL NFR BLD AUTO: 2 % — SIGNIFICANT CHANGE UP (ref 0–6)
EPI CELLS # UR: 1 /HPF — SIGNIFICANT CHANGE UP
GAS PNL BLDV: 134 MMOL/L — LOW (ref 136–145)
GAS PNL BLDV: SIGNIFICANT CHANGE UP
GLUCOSE BLDV-MCNC: 73 MG/DL — SIGNIFICANT CHANGE UP (ref 70–99)
GLUCOSE SERPL-MCNC: 70 MG/DL — SIGNIFICANT CHANGE UP (ref 70–99)
GLUCOSE UR QL: NEGATIVE — SIGNIFICANT CHANGE UP
HCG SERPL-ACNC: HIGH MIU/ML
HCO3 BLDV-SCNC: 26 MMOL/L — SIGNIFICANT CHANGE UP (ref 22–29)
HCT VFR BLD CALC: 38.2 % — SIGNIFICANT CHANGE UP (ref 34.5–45)
HCT VFR BLDA CALC: 39 % — SIGNIFICANT CHANGE UP (ref 34.5–46.5)
HGB BLD CALC-MCNC: 13.1 G/DL — SIGNIFICANT CHANGE UP (ref 11.7–16.1)
HGB BLD-MCNC: 12.6 G/DL — SIGNIFICANT CHANGE UP (ref 11.5–15.5)
IMM GRANULOCYTES NFR BLD AUTO: 0.6 % — SIGNIFICANT CHANGE UP (ref 0–1.5)
INR BLD: 1.04 RATIO — SIGNIFICANT CHANGE UP (ref 0.88–1.16)
KETONES UR-MCNC: NEGATIVE — SIGNIFICANT CHANGE UP
LACTATE BLDV-MCNC: 1 MMOL/L — SIGNIFICANT CHANGE UP (ref 0.7–2)
LEUKOCYTE ESTERASE UR-ACNC: NEGATIVE — SIGNIFICANT CHANGE UP
LYMPHOCYTES # BLD AUTO: 1.3 K/UL — SIGNIFICANT CHANGE UP (ref 1–3.3)
LYMPHOCYTES # BLD AUTO: 10.8 % — LOW (ref 13–44)
MAGNESIUM SERPL-MCNC: 2 MG/DL — SIGNIFICANT CHANGE UP (ref 1.6–2.6)
MCHC RBC-ENTMCNC: 30.2 PG — SIGNIFICANT CHANGE UP (ref 27–34)
MCHC RBC-ENTMCNC: 33 GM/DL — SIGNIFICANT CHANGE UP (ref 32–36)
MCV RBC AUTO: 91.6 FL — SIGNIFICANT CHANGE UP (ref 80–100)
MONOCYTES # BLD AUTO: 1.15 K/UL — HIGH (ref 0–0.9)
MONOCYTES NFR BLD AUTO: 9.6 % — SIGNIFICANT CHANGE UP (ref 2–14)
NEUTROPHILS # BLD AUTO: 9.22 K/UL — HIGH (ref 1.8–7.4)
NEUTROPHILS NFR BLD AUTO: 76.8 % — SIGNIFICANT CHANGE UP (ref 43–77)
NITRITE UR-MCNC: NEGATIVE — SIGNIFICANT CHANGE UP
NRBC # BLD: 0 /100 WBCS — SIGNIFICANT CHANGE UP (ref 0–0)
PCO2 BLDV: 45 MMHG — HIGH (ref 39–42)
PH BLDV: 7.37 — SIGNIFICANT CHANGE UP (ref 7.32–7.43)
PH UR: 6 — SIGNIFICANT CHANGE UP (ref 5–8)
PHOSPHATE SERPL-MCNC: 3.9 MG/DL — SIGNIFICANT CHANGE UP (ref 2.5–4.5)
PLATELET # BLD AUTO: 309 K/UL — SIGNIFICANT CHANGE UP (ref 150–400)
PO2 BLDV: 33 MMHG — SIGNIFICANT CHANGE UP (ref 25–45)
POTASSIUM BLDV-SCNC: 3.6 MMOL/L — SIGNIFICANT CHANGE UP (ref 3.5–5.1)
POTASSIUM SERPL-MCNC: 3.5 MMOL/L — SIGNIFICANT CHANGE UP (ref 3.5–5.3)
POTASSIUM SERPL-SCNC: 3.5 MMOL/L — SIGNIFICANT CHANGE UP (ref 3.5–5.3)
PROT SERPL-MCNC: 7.1 G/DL — SIGNIFICANT CHANGE UP (ref 6–8.3)
PROT UR-MCNC: NEGATIVE — SIGNIFICANT CHANGE UP
PROTHROM AB SERPL-ACNC: 12 SEC — SIGNIFICANT CHANGE UP (ref 10.5–13.4)
RAPID RVP RESULT: DETECTED
RBC # BLD: 4.17 M/UL — SIGNIFICANT CHANGE UP (ref 3.8–5.2)
RBC # FLD: 12.6 % — SIGNIFICANT CHANGE UP (ref 10.3–14.5)
RBC CASTS # UR COMP ASSIST: 0 /HPF — SIGNIFICANT CHANGE UP (ref 0–4)
RH IG SCN BLD-IMP: POSITIVE — SIGNIFICANT CHANGE UP
RV+EV RNA SPEC QL NAA+PROBE: DETECTED
SAO2 % BLDV: 54.1 % — LOW (ref 67–88)
SARS-COV-2 RNA SPEC QL NAA+PROBE: SIGNIFICANT CHANGE UP
SODIUM SERPL-SCNC: 135 MMOL/L — SIGNIFICANT CHANGE UP (ref 135–145)
SP GR SPEC: 1.01 — LOW (ref 1.01–1.02)
UROBILINOGEN FLD QL: NEGATIVE — SIGNIFICANT CHANGE UP
WBC # BLD: 12.01 K/UL — HIGH (ref 3.8–10.5)
WBC # FLD AUTO: 12.01 K/UL — HIGH (ref 3.8–10.5)
WBC UR QL: 1 /HPF — SIGNIFICANT CHANGE UP (ref 0–5)

## 2022-05-02 PROCEDURE — 82435 ASSAY OF BLOOD CHLORIDE: CPT

## 2022-05-02 PROCEDURE — G1004: CPT

## 2022-05-02 PROCEDURE — 85730 THROMBOPLASTIN TIME PARTIAL: CPT

## 2022-05-02 PROCEDURE — 76830 TRANSVAGINAL US NON-OB: CPT

## 2022-05-02 PROCEDURE — 96361 HYDRATE IV INFUSION ADD-ON: CPT

## 2022-05-02 PROCEDURE — 83605 ASSAY OF LACTIC ACID: CPT

## 2022-05-02 PROCEDURE — 74181 MRI ABDOMEN W/O CONTRAST: CPT | Mod: 26,MG

## 2022-05-02 PROCEDURE — 96374 THER/PROPH/DIAG INJ IV PUSH: CPT

## 2022-05-02 PROCEDURE — 84100 ASSAY OF PHOSPHORUS: CPT

## 2022-05-02 PROCEDURE — 81001 URINALYSIS AUTO W/SCOPE: CPT

## 2022-05-02 PROCEDURE — 82330 ASSAY OF CALCIUM: CPT

## 2022-05-02 PROCEDURE — 85025 COMPLETE CBC W/AUTO DIFF WBC: CPT

## 2022-05-02 PROCEDURE — 86901 BLOOD TYPING SEROLOGIC RH(D): CPT

## 2022-05-02 PROCEDURE — 82947 ASSAY GLUCOSE BLOOD QUANT: CPT

## 2022-05-02 PROCEDURE — 85610 PROTHROMBIN TIME: CPT

## 2022-05-02 PROCEDURE — 87086 URINE CULTURE/COLONY COUNT: CPT

## 2022-05-02 PROCEDURE — 86850 RBC ANTIBODY SCREEN: CPT

## 2022-05-02 PROCEDURE — 76830 TRANSVAGINAL US NON-OB: CPT | Mod: 26

## 2022-05-02 PROCEDURE — 85018 HEMOGLOBIN: CPT

## 2022-05-02 PROCEDURE — 99285 EMERGENCY DEPT VISIT HI MDM: CPT | Mod: 25

## 2022-05-02 PROCEDURE — 76705 ECHO EXAM OF ABDOMEN: CPT

## 2022-05-02 PROCEDURE — 0225U NFCT DS DNA&RNA 21 SARSCOV2: CPT

## 2022-05-02 PROCEDURE — 74181 MRI ABDOMEN W/O CONTRAST: CPT | Mod: MG

## 2022-05-02 PROCEDURE — 84132 ASSAY OF SERUM POTASSIUM: CPT

## 2022-05-02 PROCEDURE — 87040 BLOOD CULTURE FOR BACTERIA: CPT

## 2022-05-02 PROCEDURE — 84702 CHORIONIC GONADOTROPIN TEST: CPT

## 2022-05-02 PROCEDURE — 76705 ECHO EXAM OF ABDOMEN: CPT | Mod: 26

## 2022-05-02 PROCEDURE — 85014 HEMATOCRIT: CPT

## 2022-05-02 PROCEDURE — 84295 ASSAY OF SERUM SODIUM: CPT

## 2022-05-02 PROCEDURE — 83735 ASSAY OF MAGNESIUM: CPT

## 2022-05-02 PROCEDURE — 80053 COMPREHEN METABOLIC PANEL: CPT

## 2022-05-02 PROCEDURE — 36415 COLL VENOUS BLD VENIPUNCTURE: CPT

## 2022-05-02 PROCEDURE — 82565 ASSAY OF CREATININE: CPT

## 2022-05-02 PROCEDURE — 93005 ELECTROCARDIOGRAM TRACING: CPT

## 2022-05-02 PROCEDURE — 82803 BLOOD GASES ANY COMBINATION: CPT

## 2022-05-02 PROCEDURE — 99285 EMERGENCY DEPT VISIT HI MDM: CPT

## 2022-05-02 PROCEDURE — 99283 EMERGENCY DEPT VISIT LOW MDM: CPT | Mod: GC

## 2022-05-02 PROCEDURE — 86900 BLOOD TYPING SEROLOGIC ABO: CPT

## 2022-05-02 RX ORDER — ACETAMINOPHEN 500 MG
975 TABLET ORAL ONCE
Refills: 0 | Status: COMPLETED | OUTPATIENT
Start: 2022-05-02 | End: 2022-05-02

## 2022-05-02 RX ORDER — ACETAMINOPHEN 500 MG
1000 TABLET ORAL ONCE
Refills: 0 | Status: DISCONTINUED | OUTPATIENT
Start: 2022-05-02 | End: 2022-05-02

## 2022-05-02 RX ORDER — CEFOXITIN 1 G/1
2 INJECTION, POWDER, FOR SOLUTION INTRAVENOUS ONCE
Refills: 0 | Status: COMPLETED | OUTPATIENT
Start: 2022-05-02 | End: 2022-05-02

## 2022-05-02 RX ORDER — SODIUM CHLORIDE 9 MG/ML
2000 INJECTION INTRAMUSCULAR; INTRAVENOUS; SUBCUTANEOUS ONCE
Refills: 0 | Status: COMPLETED | OUTPATIENT
Start: 2022-05-02 | End: 2022-05-02

## 2022-05-02 RX ORDER — ONDANSETRON 8 MG/1
4 TABLET, FILM COATED ORAL ONCE
Refills: 0 | Status: DISCONTINUED | OUTPATIENT
Start: 2022-05-02 | End: 2022-05-02

## 2022-05-02 RX ADMIN — SODIUM CHLORIDE 2000 MILLILITER(S): 9 INJECTION INTRAMUSCULAR; INTRAVENOUS; SUBCUTANEOUS at 17:40

## 2022-05-02 RX ADMIN — SODIUM CHLORIDE 2000 MILLILITER(S): 9 INJECTION INTRAMUSCULAR; INTRAVENOUS; SUBCUTANEOUS at 14:50

## 2022-05-02 RX ADMIN — Medication 975 MILLIGRAM(S): at 14:51

## 2022-05-02 RX ADMIN — CEFOXITIN 100 GRAM(S): 1 INJECTION, POWDER, FOR SOLUTION INTRAVENOUS at 18:28

## 2022-05-02 RX ADMIN — Medication 975 MILLIGRAM(S): at 17:40

## 2022-05-02 NOTE — ED PROVIDER NOTE - CLINICAL SUMMARY MEDICAL DECISION MAKING FREE TEXT BOX
25yo  F @17w p/w 3d of RLQ pain and 1d of fevers, vs significant for low grade fever and tachycardia, phys exam significant for RLQ abd pain. DDx appy vs. ovarian pathology; septic workup w/labs, BCx, UA+UCx, MR abdomen, pain ctrl, and reassess. - Jaxson Sandoval, PGY-1

## 2022-05-02 NOTE — ED PROVIDER NOTE - NSFOLLOWUPINSTRUCTIONS_ED_ALL_ED_FT
You were seen in the ED for fevers and abdominal pain. You had labs, urine tests, imaging, and were seen by ob/gyn.    Your results showed you have rhinovirus (a common upper respiratory viral infection, also known as the common cold). To treat, rest and stay hydrated. Take Tylenol as needed for pain.    Your ultrasound results had not returned at the time you left the hospital. Please call for your results.     Please follow-up with your primary care doctor, and your ob/gyn.    ***Return to the ED if you have any new or worsening symptoms such as worsening abdominal pain, difficulty breathing, chest pain, vaginal bleeding, or any other concerning symptoms*** You were seen in the ED for fevers and abdominal pain. You had labs, urine tests, imaging, and were seen by ob/gyn.    Your results showed you have rhinovirus (a common upper respiratory viral infection, also known as the common cold). To treat, rest and stay hydrated. Take Tylenol as needed for pain.    Your ultrasound results showed no emergent findings at this time.     Please follow-up with your primary care doctor, and your ob/gyn.    ***Return to the ED if you have any new or worsening symptoms such as worsening abdominal pain, difficulty breathing, chest pain, vaginal bleeding, or any other concerning symptoms***

## 2022-05-02 NOTE — ED ADULT NURSE REASSESSMENT NOTE - NS ED NURSE REASSESS COMMENT FT1
Pt 17 weeks pregnant, RLQ pain and fevers. Spoke to OB resident at ext [phone] and approved acceptance to L&D. Pt will be evaluated in ED first to be medically cleared. OB resident states pt must be evaluated/ consulted GYN. Pt made aware of care plan.
Pt transported to MRI with all belongings and family with patient. Pt fluids connected to MRI tubing
Received report this PM from [facility] KHOURY. PT observed resting comfortably in bed. Pt denies any needs at this time. Awaiting MRI, pt completed form and provided to red desk to fax to MRI. Plan of care reviewed with pt. Pt educated on call bell system and provided call bell. Bed in lowest position, wheels locked, and patient placed in position of comfort.
urine had been previously sent, but laboratory states they do not have specimen. Urine cup kept previously, urinalysis and urine culture redrawn. Pt states she has no pain.

## 2022-05-02 NOTE — ED ADULT NURSE NOTE - OBJECTIVE STATEMENT
1345 26 yr old BF c/o RLQ pain, periumbilical pain and fever x 2 days. Pt is 17 wks pregnant. Para 0  1. LMP 2022. Denies N/V/D or vag bleeding. A&Ox4. ambulatory.  Skin W&D. lips and nailbeds pink. abd TTP RLQ and periumbilical region

## 2022-05-02 NOTE — ED PROVIDER NOTE - HIV OFFER
[FreeTextEntry1] : This is a 78-year-old male with past medical history significant for mitral valve prolapse with moderate mitral valve regurgitation, hypertension, murmur, dyspepsia, reactive airway disease, who comes in for preoperative cardiac clearance.  He denies chest pain, shortness of breath, dizziness or syncope.\par He is scheduled for left hip replacement surgery in the next few weeks.\par A lipid panel done by his primary care physician October 19, 2021 demonstrated cholesterol 178, HDL of 52, triglycerides of 54, non-HDL cholesterol 126, and LDL calculated 115 mg/dL with hemoglobin A1c of 5.8.\par He had a normal exercise stress test March 18, 2020.\par Echo Doppler examination done September 1, 2021 demonstrated mitral valve prolapse with mild to moderate mitral valve regurgitation, mild to moderate tricuspid valve regurgitation, mild pulmonic valve regurgitation with normal left ventricular ejection fraction 65%.\par Electrocardiogram done November 15, 2021 demonstrate normal sinus rhythm at a rate of 86 bpm is otherwise unremarkable.\par \par \par The patient is cleared from a cardiac standpoint for hip replacement surgery.  Please avoid overhydration.  Maintain prophylaxis for deep venous thrombosis.  The patient should have an incentive spirometer in the perioperative period.  The patient should be allowed to take his Altace for hypertension in the perioperative period.\par \par \par He is hemodynamically stable and asymptomatic from a cardiac standpoint.\par The patient understands that aerobic exercises must be increased to 40 minutes 4 times per week. A detailed discussion of lifestyle modification was done today. The patient has a good understanding of the diagnosis, and treatment plan. Lifestyle modification was also outlined.
Opt out

## 2022-05-02 NOTE — ED PROVIDER NOTE - PATIENT PORTAL LINK FT
You can access the FollowMyHealth Patient Portal offered by [facility] by registering at the following website: [email or web address]. By joining [employer]’s FollowMyHealth portal, you will also be able to view your health information using other applications (apps) compatible with our system.

## 2022-05-02 NOTE — ED PROVIDER NOTE - OBJECTIVE STATEMENT
Pt is a 26yoF  at 17w2d, no relevant PMHx p/w abdominal pain. Pt has had 3d of RLQ "cramping" nonradiating abd pain, intermittent, 8/10, not relieved by tylenol, associated w/1d of fevers (yesterday TMax 101.9F oral). She denies vaginal bleeding, nausea/vomiting, diarrhea/constipation, chills, SOB, CP, HA, swelling; no Hx of STIs, fibroids, or ovarian cysts, denies trauma.     Ob/gyn: Dr. Martin

## 2022-05-02 NOTE — ED ADULT TRIAGE NOTE - NS ED NURSE NOTE DISPO AOU DETAILS FT
Pt 17 weeks pregnant, RLQ pain and fevers. Spoke to OB resident at ext [phone] and approved acceptance to L&D.

## 2022-05-02 NOTE — ED ADULT NURSE NOTE - ED STAT RN HANDOFF DETAILS 2
Handoff report provided to oncoming nurse Patsy BRADLEY RN. Understands pmh, medications given, and plan of care for patient. Patient in stable condition, vital signs updated, and patient has no complaints at this time and has been updated on care plan. Explained to patient that new nurse is taking over, pt verbalized understanding.

## 2022-05-02 NOTE — ED PROVIDER NOTE - PROGRESS NOTE DETAILS
Patient asking to leave AMA. Will discharge AMA. Patient was informed of the risks of leaving against medical advice.

## 2022-05-02 NOTE — ED PROVIDER NOTE - ATTENDING CONTRIBUTION TO CARE
Andres Mackay MD, FACEP: In this physician's medical judgement based on clinical history and physical exam: patient with right lower quadrant pain x 3 d and now fever. Mild to moderate pain but has worsened. no nausea/vomiting or diarrhea.  ncat  trachea midline  mmm  hr   non-tachypneic  right lower quadrant pain present  no rebound/guarding  no gross edema  no rash/vesicles/petechiae  cooperative, with capacity and insight   See MDM above.  right lower quadrant pain and fever with pregnancy, will get iv, cbc, cmp, analgesia and antiemetic prn, ua, urine culture, blood cultures x 2, cxr prn, t&s, MRI abdomen for appendicitis and surgery consult prn, will rvp.  Will follow up on labs, analgesia, imaging, reassess and disposition as clinically indicated.  *The above represents an initial assessment/impression. Please refer to my progress notes below for potential changes in patient clinical course*

## 2022-05-02 NOTE — ED PROVIDER NOTE - PHYSICAL EXAMINATION
Gen: AAOx3, non-toxic, WDWN young woman lying in bed in NAD  Head: NCAT  HEENT: EOMI, PERRLA, +oral mucosa dry, normal conjunctiva  Lung: CTAB, no respiratory distress, no wheezes/rhonchi/rales B/L, speaking in full sentences  CV: tachycardic w/regular rhythm, +S1/S2, no murmurs, rubs or gallops  Abd: +RLQ ttp w/o rebound or guarding, BSx4, no CVA tenderness, nontender fundus palpable ~3cm below umbilicus  MSK: no visible deformities  Neuro: No focal sensory or motor deficits  Skin: Warm, well perfused, no rash, no edema   Psych: pleasant, normal affect.   ~Jaxson Sandoval M.D. Resident Gen: AAOx3, non-toxic, WDWN young woman lying in bed in NAD  Head: NCAT  HEENT: EOMI, PERRLA, +oral mucosa dry, normal conjunctiva  Lung: CTAB, no respiratory distress, no wheezes/rhonchi/rales B/L, speaking in full sentences  CV: tachycardic w/regular rhythm, +S1/S2, no murmurs, rubs or gallops  Abd: +RLQ ttp w/o rebound or guarding, BSx4, no CVA tenderness, nontender fundus palpable ~3cm below umbilicus; FHR 162bpm  MSK: no visible deformities  Neuro: No focal sensory or motor deficits  Skin: Warm, well perfused, no rash, no edema   Psych: pleasant, normal affect.   ~Jaxson Sandoval M.D. Resident

## 2022-05-02 NOTE — CONSULT NOTE ADULT - SUBJECTIVE AND OBJECTIVE BOX
JAMAR FLORES  26y  Female [id number]    HPI: Pt is a 27yo  @17.1wks GA presenting to the ED w/ c/o RLQ pain.         Name of GYN Physician:   OBHx:    GynHx: Denies fibroids, cysts, endometriosis, STI's, Abnormal pap smears   PMH:  PSH:  Meds:  Allx:  Social History:  Denies smoking use, drug use, alcohol use.   +occasional social alcohol use    Vital Signs Last 24 Hrs  T(C): 36.7 (02 May 2022 17:40), Max: 37.7 (02 May 2022 11:59)  T(F): 98 (02 May 2022 17:40), Max: 99.9 (02 May 2022 11:59)  HR: 103 (02 May 2022 17:40) (103 - 135)  BP: 121/78 (02 May 2022 17:40) (98/60 - 121/78)  BP(mean): 90 (02 May 2022 17:40) (90 - 90)  RR: 12 (02 May 2022 17:40) (12 - 18)  SpO2: 100% (02 May 2022 17:40) (100% - 100%)    Physical Exam:   General: sitting comfortably in bed, NAD   HEENT: neck supple, full ROM  CV: RRR  Lungs: CTA b/l, good air flow b/l   Back: No CVA tenderness  Abd: Soft, non-tender, non-distended.  Bowel sounds present.    :  No bleeding on pad.    External labia wnl.  Bimanual exam with no cervical motion tenderness, uterus wnl, adnexa non palpable b/l.  Cervix closed vs. Cervix dilated  Speculum Exam: No active bleeding from os.  Physiologic discharge.    Ext: non-tender b/l, no edema     LABS:                              12.6   12.01 )-----------( 309      ( 02 May 2022 14:31 )             38.2     05-02    135  |  102  |  4<L>  ----------------------------<  70  3.5   |  22  |  0.39<L>    Ca    9.5      02 May 2022 14:31  Phos  3.9     05-02  Mg     2.0     05-02    TPro  7.1  /  Alb  4.0  /  TBili  0.3  /  DBili  x   /  AST  87<H>  /  ALT  211<H>  /  AlkPhos  77  05-    I&O's Detail    PT/INR - ( 02 May 2022 14:31 )   PT: 12.0 sec;   INR: 1.04 ratio         PTT - ( 02 May 2022 14:31 )  PTT:31.6 sec  Urinalysis Basic - ( 02 May 2022 17:48 )    Color: Light Yellow / Appearance: Clear / S.009 / pH: x  Gluc: x / Ketone: Negative  / Bili: Negative / Urobili: Negative   Blood: x / Protein: Negative / Nitrite: Negative   Leuk Esterase: Negative / RBC: 0 /hpf / WBC 1 /HPF   Sq Epi: x / Non Sq Epi: 1 /hpf / Bacteria: Negative        RADIOLOGY & ADDITIONAL STUDIES:     JAMAR FLORES  26y  Female [id number]    *INCOMPLETE NOTE*    HPI: Pt is a 27yo  @17.1wks GA presenting to the ED w/ c/o RLQ pain.         Name of GYN Physician:   OBHx:    GynHx: Denies fibroids, cysts, endometriosis, STI's, Abnormal pap smears   PMH:  PSH:  Meds:  Allx:  Social History:  Denies smoking use, drug use, alcohol use.   +occasional social alcohol use    Vital Signs Last 24 Hrs  T(C): 36.7 (02 May 2022 17:40), Max: 37.7 (02 May 2022 11:59)  T(F): 98 (02 May 2022 17:40), Max: 99.9 (02 May 2022 11:59)  HR: 103 (02 May 2022 17:40) (103 - 135)  BP: 121/78 (02 May 2022 17:40) (98/60 - 121/78)  BP(mean): 90 (02 May 2022 17:40) (90 - 90)  RR: 12 (02 May 2022 17:40) (12 - 18)  SpO2: 100% (02 May 2022 17:40) (100% - 100%)    Physical Exam:   General: sitting comfortably in bed, NAD   HEENT: neck supple, full ROM  CV: RRR  Lungs: CTA b/l, good air flow b/l   Back: No CVA tenderness  Abd: Soft, non-tender, non-distended.  Bowel sounds present.    :  No bleeding on pad.    External labia wnl.  Bimanual exam with no cervical motion tenderness, uterus wnl, adnexa non palpable b/l.  Cervix closed vs. Cervix dilated  Speculum Exam: No active bleeding from os.  Physiologic discharge.    Ext: non-tender b/l, no edema     LABS:                              12.6   12.01 )-----------( 309      ( 02 May 2022 14:31 )             38.2     05-02    135  |  102  |  4<L>  ----------------------------<  70  3.5   |  22  |  0.39<L>    Ca    9.5      02 May 2022 14:31  Phos  3.9     05-02  Mg     2.0     05-02    TPro  7.1  /  Alb  4.0  /  TBili  0.3  /  DBili  x   /  AST  87<H>  /  ALT  211<H>  /  AlkPhos  77  -    I&O's Detail    PT/INR - ( 02 May 2022 14:31 )   PT: 12.0 sec;   INR: 1.04 ratio         PTT - ( 02 May 2022 14:31 )  PTT:31.6 sec  Urinalysis Basic - ( 02 May 2022 17:48 )    Color: Light Yellow / Appearance: Clear / S.009 / pH: x  Gluc: x / Ketone: Negative  / Bili: Negative / Urobili: Negative   Blood: x / Protein: Negative / Nitrite: Negative   Leuk Esterase: Negative / RBC: 0 /hpf / WBC 1 /HPF   Sq Epi: x / Non Sq Epi: 1 /hpf / Bacteria: Negative        RADIOLOGY & ADDITIONAL STUDIES:     JAMAR FLORES  26y  Female [id number]    HPI: Pt is a 27yo  @17.1wks GA presenting to the ED w/ c/o RLQ pain & fever. Pt states she began having intermittent right sided abdominal pain Saturday night. Initially pt thought the pain was related to gas. Pain is crampy in nature, improves with food & tylenol. Pt also c/o increased sneezing & SOB. Pt febrile to 101F at home last night. Denies CP, N/V, changes in appetite, vaginal bleeding, vaginal discharge, dysuria, changes in bowel movements.    Name of GYN Physician: Dr. Martin  OBHx: none    GynHx: Denies fibroids, cysts, endometriosis, STI's, Abnormal pap smears   PMH: denies  PSH: dental implant  Meds: PNV  Allx: NKDA  Social History:  Denies smoking use, drug use, alcohol use.    Vital Signs Last 24 Hrs  T(C): 36.7 (02 May 2022 17:40), Max: 37.7 (02 May 2022 11:59)  T(F): 98 (02 May 2022 17:40), Max: 99.9 (02 May 2022 11:59)  HR: 103 (02 May 2022 17:40) (103 - 135)  BP: 121/78 (02 May 2022 17:40) (98/60 - 121/78)  BP(mean): 90 (02 May 2022 17:40) (90 - 90)  RR: 12 (02 May 2022 17:40) (12 - 18)  SpO2: 100% (02 May 2022 17:40) (100% - 100%)    Physical Exam:   General: sitting comfortably in bed, NAD   Lungs: nonlabored breathing on RA  Back: No CVA tenderness  Abd: Soft, mildly tender to palpation right mid abdomen, non-distended.   :  No bleeding on pad.    External labia wnl.  Bimanual exam with no cervical motion tenderness, uterus wnl, adnexa non palpable b/l.  Cervix closed.  Speculum Exam: No active bleeding from os.  Physiologic discharge.    Ext: non-tender b/l, no edema     ED Resident Rodriguez at bedside for pelvic exam.    LABS:                        12.6   12.01 )-----------( 309      ( 02 May 2022 14:31 )             38.2         135  |  102  |  4<L>  ----------------------------<  70  3.5   |  22  |  0.39<L>    Ca    9.5      02 May 2022 14:31  Phos  3.9       Mg     2.0         TPro  7.1  /  Alb  4.0  /  TBili  0.3  /  DBili  x   /  AST  87<H>  /  ALT  211<H>  /  AlkPhos  77      I&O's Detail    PT/INR - ( 02 May 2022 14:31 )   PT: 12.0 sec;   INR: 1.04 ratio         PTT - ( 02 May 2022 14:31 )  PTT:31.6 sec  Urinalysis Basic - ( 02 May 2022 17:48 )    Color: Light Yellow / Appearance: Clear / S.009 / pH: x  Gluc: x / Ketone: Negative  / Bili: Negative / Urobili: Negative   Blood: x / Protein: Negative / Nitrite: Negative   Leuk Esterase: Negative / RBC: 0 /hpf / WBC 1 /HPF   Sq Epi: x / Non Sq Epi: 1 /hpf / Bacteria: Negative        RADIOLOGY & ADDITIONAL STUDIES:  < from: MR Abdomen No Cont (22 @ 17:07) >    ACC: [id number] EXAM:  MR ABDOMEN                          PROCEDURE DATE:  2022          INTERPRETATION:  CLINICAL INFORMATION: Pregnant (17 weeks 2 days). 3 days   of abdominal pain followed by one day with fever. Evaluate for   appendicitis.    COMPARISON: CT abdomen pelvis 2014    CONTRAST/COMPLICATIONS:  IV Contrast: None  Oral Contrast: None  Complications: None    PROCEDURE:  MRI of the abdomen and pelvis was performed as per appendicitis protocol.      FINDINGS:  LOWER CHEST: Within normal limits.    LIVER: Within normal limits.  BILE DUCTS: Normal caliber.  GALLBLADDER: Within normal limits.  SPLEEN: Within normal limits.  PANCREAS: Within normal limits.  ADRENALS: Within normal limits.  KIDNEYS/URETERS: Within normal limits.    BLADDER: Within normal limits.  REPRODUCTIVE ORGANS: Single intrauterine gestation. Placenta is   posterior. Cervix is closed. Ovaries are normal in appearance. No adnexal   mass.    BOWEL: No evidence of bowel obstruction. Appendix is normal.  PERITONEUM: No ascites.  VESSELS: Within normal limits.  RETROPERITONEUM/LYMPH NODES: No lymphadenopathy.  ABDOMINAL WALL: Within normal limits.  BONES: Within normal limits.    IMPRESSION:  No acute pathology. Normal appendix.        --- End of Report ---            WESTON GERARDO MD; Attending Radiologist  This document has been electronically signed. May  2 2022  5:06PM    < end of copied text >     JAMAR FLORES  26y  Female [id number]    HPI: Pt is a 25yo  @17.1wks GA presenting to the ED w/ c/o RLQ pain & fever. Pt states she began having intermittent right sided abdominal pain Saturday night. Initially pt thought the pain was related to gas. Pain is crampy in nature, improves with food & tylenol. Pt also c/o increased sneezing & SOB. Pt febrile to 101F at home last night. Denies CP, N/V, changes in appetite, vaginal bleeding, vaginal discharge, dysuria, changes in bowel movements.    Name of GYN Physician: Dr. Martin  OBHx: none    GynHx: Denies fibroids, cysts, endometriosis, STI's, Abnormal pap smears   PMH: denies  PSH: dental implant  Meds: PNV  Allx: NKDA  Social History:  Denies smoking use, drug use, alcohol use.    Vital Signs Last 24 Hrs  T(C): 36.7 (02 May 2022 17:40), Max: 37.7 (02 May 2022 11:59)  T(F): 98 (02 May 2022 17:40), Max: 99.9 (02 May 2022 11:59)  HR: 103 (02 May 2022 17:40) (103 - 135)  BP: 121/78 (02 May 2022 17:40) (98/60 - 121/78)  BP(mean): 90 (02 May 2022 17:40) (90 - 90)  RR: 12 (02 May 2022 17:40) (12 - 18)  SpO2: 100% (02 May 2022 17:40) (100% - 100%)    Physical Exam:   General: sitting comfortably in bed, NAD   Lungs: nonlabored breathing on RA  Back: No CVA tenderness  Abd: Soft, mildly tender to palpation right mid abdomen, non-distended.   :  No bleeding on pad.    External labia wnl.  Bimanual exam with no cervical motion tenderness, uterus wnl, adnexa non palpable b/l.  Cervix closed.  Speculum Exam: No active bleeding from os.  Physiologic discharge.    Ext: non-tender b/l, no edema     ED Resident Rodriguez at bedside for pelvic exam.    LABS:                        12.6   12.01 )-----------( 309      ( 02 May 2022 14:31 )             38.2         135  |  102  |  4<L>  ----------------------------<  70  3.5   |  22  |  0.39<L>    Ca    9.5      02 May 2022 14:31  Phos  3.9       Mg     2.0         TPro  7.1  /  Alb  4.0  /  TBili  0.3  /  DBili  x   /  AST  87<H>  /  ALT  211<H>  /  AlkPhos  77      I&O's Detail    PT/INR - ( 02 May 2022 14:31 )   PT: 12.0 sec;   INR: 1.04 ratio         PTT - ( 02 May 2022 14:31 )  PTT:31.6 sec  Urinalysis Basic - ( 02 May 2022 17:48 )    Color: Light Yellow / Appearance: Clear / S.009 / pH: x  Gluc: x / Ketone: Negative  / Bili: Negative / Urobili: Negative   Blood: x / Protein: Negative / Nitrite: Negative   Leuk Esterase: Negative / RBC: 0 /hpf / WBC 1 /HPF   Sq Epi: x / Non Sq Epi: 1 /hpf / Bacteria: Negative        RADIOLOGY & ADDITIONAL STUDIES:  < from: MR Abdomen No Cont (22 @ 17:07) >    ACC: [id number] EXAM:  MR ABDOMEN                          PROCEDURE DATE:  2022          INTERPRETATION:  CLINICAL INFORMATION: Pregnant (17 weeks 2 days). 3 days   of abdominal pain followed by one day with fever. Evaluate for   appendicitis.    COMPARISON: CT abdomen pelvis 2014    CONTRAST/COMPLICATIONS:  IV Contrast: None  Oral Contrast: None  Complications: None    PROCEDURE:  MRI of the abdomen and pelvis was performed as per appendicitis protocol.      FINDINGS:  LOWER CHEST: Within normal limits.    LIVER: Within normal limits.  BILE DUCTS: Normal caliber.  GALLBLADDER: Within normal limits.  SPLEEN: Within normal limits.  PANCREAS: Within normal limits.  ADRENALS: Within normal limits.  KIDNEYS/URETERS: Within normal limits.    BLADDER: Within normal limits.  REPRODUCTIVE ORGANS: Single intrauterine gestation. Placenta is   posterior. Cervix is closed. Ovaries are normal in appearance. No adnexal   mass.    BOWEL: No evidence of bowel obstruction. Appendix is normal.  PERITONEUM: No ascites.  VESSELS: Within normal limits.  RETROPERITONEUM/LYMPH NODES: No lymphadenopathy.  ABDOMINAL WALL: Within normal limits.  BONES: Within normal limits.    IMPRESSION:  No acute pathology. Normal appendix.        --- End of Report ---            WESTON GERARDO MD; Attending Radiologist  This document has been electronically signed. May  2 2022  5:06PM    < end of copied text >      ACC: [id number] EXAM:  US ABDOMEN RT UPR QUADRANT                          PROCEDURE DATE:  2022          INTERPRETATION:  CLINICAL INFORMATION: Right-sided abdominal pain.   Patient 17 weeks pregnant.    COMPARISON: MR abdomen 2022.    TECHNIQUE: Sonography of the right upper quadrant.    FINDINGS:  Liver: Within normal limits.  Bile ducts: Normal caliber. Common bile duct measures 4 mm.  Gallbladder: Within normal limits.  Pancreas: Visualized portions are within normal limits.  Rightkidney: 11.1 cm. no hydronephrosis. Normal echotexture..  Ascites: None.  IVC: Visualized portions are normal in caliber.    IMPRESSION:  Normal right upper quadrant abdominal ultrasound.    --- End of Report ---          ALECIA MARTINES MD; Resident Radiologist  This document has been electronically signed.  LUCAS LYNNE MD; Attending Radiologist  This document has been electronically signed. May  2 2022 10:52PM      ACC: [id number] EXAM:  US TRANSVAGINAL                          PROCEDURE DATE:  2022          INTERPRETATION:  CLINICAL INFORMATION: Right lower quadrant pain during   pregnancy. Evaluate cervical length.    LMP: 2022    COMPARISON: Pelvicultrasound 2014.    TECHNIQUE:  Limited endovaginal ultrasound to evaluate cervical length.  FINDINGS:    Cervix: 3.1 cm in length. The cervix is closed without funneling. Trace   endocervical fluid.    No free fluid in the cul-de-sac.    The intrauterine pregnancy is not evaluated on this exam.    IMPRESSION:  Cervix is long and closed, measures 3.1 cm in length.    --- End of Report ---          ALECIA MARTINES MD; Resident Radiologist  This document has been electronically signed.  LUCAS LYNNE MD; Attending Radiologist  This document has been electronically signed. May  2 2022 10:25PM

## 2022-05-02 NOTE — CONSULT NOTE ADULT - ATTENDING COMMENTS
OB attg note    25yo P0 at 17+ weeks with R groin pain and fevers at home. No e/o PTL at this time, CL wnl on sono and SVE closed. Suspect round ligament discomfort. +Enterovirus, discussed expectant mgmt and hydration, tylenol prn. Elev LFTs, non specific, recommend RUQ sono and if normal, plan to repeat outpatient.    Edy ARGUELLES

## 2022-05-02 NOTE — ED PROVIDER NOTE - NS ED ROS FT
GENERAL: No fever or chills, EYES: no change in vision, HEENT: no trouble swallowing or speaking, CARDIAC: no chest pain, PULMONARY: no cough or SOB, GI: +abdominal pain, no nausea, no vomiting, no diarrhea or constipation, : No changes in urination, SKIN: no rashes, NEURO: no headache,  MSK: No joint pain     All other ROS negative unless otherwise specified in HPI.     ~Jaxson Sandoval M.D. Resident

## 2022-05-02 NOTE — CONSULT NOTE ADULT - ASSESSMENT
Pt is a 27yo  @17.1wks GA presenting to the ED w/ c/o RLQ pain & fever. Pt tachycardic in ED. MRI w/o evidence of appendicitis.     -possible URI (flu vs COVID)  -possible round ligament pain  -f/u TVUS & cervical length  -recommend RUQ sono to r/o gallbladder pathology  -f/u RVP  -final recs pending RVP & US results    Leonila Ortez, PGY2  D/w & seen by Dr. Rowell  Pt is a 25yo  @17.1wks GA presenting to the ED w/ c/o RLQ pain & fever. Pt tachycardic in ED. MRI w/o evidence of appendicitis.     -possible URI (flu vs COVID)  -possible round ligament pain  -f/u TVUS & cervical length  -recommend RUQ sono to r/o gallbladder pathology  -f/u RVP  -final recs pending RVP & US results    Leonila Ortez, PGY2  D/w & seen by Dr. Rowell    ---------------------------------------------------------------------  TVUS w/ appropriate CL (3.1cm). No evidence of  labor. FHR in Ed 162.  No evidence of gallbladder or appendix pathology.  Pt w/ +rhinovirus. URI symptoms 2/2 rhinovirus infection.  Lower abdominal pain likely 2/2 round ligament pain vs MSK pain.   Pt to f/u w/ Dr. Martin as per routine PNC.     Mitchell, PGY2  D/w Dr. Rowell

## 2022-05-02 NOTE — ED PROVIDER NOTE - SHIFT CHANGE DETAILS
Andres Mackay MD F[facility] note of transfer at the usual time of sign out: Receiving team will follow up on labs, analgesia, any clinical imaging, reassess and disposition as clinically indicated.  Details of patient and plan conveyed to receiving physician and conveyed back for understanding.  There were no questions at this time about the patient's status, disposition, and plan. Patient's care to be taken over by receiving physician at this time, all decisions regarding the progression of care will be made at their discretion.

## 2022-05-03 ENCOUNTER — NON-APPOINTMENT (OUTPATIENT)
Age: 27
End: 2022-05-03

## 2022-05-03 LAB
1ST TRIMESTER DATA: NORMAL
2ND TRIMESTER DATA: NORMAL
AFP PNL SERPL: NORMAL
AFP SERPL-ACNC: NORMAL
AFP SERPL-ACNC: NORMAL
B-HCG FREE SERPL-MCNC: NORMAL
CLINICAL BIOCHEMIST REVIEW: NORMAL
FREE BETA HCG 1ST TRIMESTER: NORMAL
INHIBIN A SERPL-MCNC: NORMAL
NOTES NTD: NORMAL
NT: NORMAL
PAPP-A SERPL-ACNC: NORMAL
U ESTRIOL SERPL-SCNC: NORMAL

## 2022-05-04 LAB
CULTURE RESULTS: SIGNIFICANT CHANGE UP
SPECIMEN SOURCE: SIGNIFICANT CHANGE UP

## 2022-05-07 LAB
CULTURE RESULTS: SIGNIFICANT CHANGE UP
CULTURE RESULTS: SIGNIFICANT CHANGE UP
SPECIMEN SOURCE: SIGNIFICANT CHANGE UP
SPECIMEN SOURCE: SIGNIFICANT CHANGE UP

## 2022-05-23 ENCOUNTER — APPOINTMENT (OUTPATIENT)
Dept: ANTEPARTUM | Facility: CLINIC | Age: 27
End: 2022-05-23
Payer: COMMERCIAL

## 2022-05-23 ENCOUNTER — ASOB RESULT (OUTPATIENT)
Age: 27
End: 2022-05-23

## 2022-05-23 PROCEDURE — 76811 OB US DETAILED SNGL FETUS: CPT

## 2022-06-02 ENCOUNTER — NON-APPOINTMENT (OUTPATIENT)
Age: 27
End: 2022-06-02

## 2022-06-03 ENCOUNTER — APPOINTMENT (OUTPATIENT)
Dept: OBGYN | Facility: CLINIC | Age: 27
End: 2022-06-03
Payer: COMMERCIAL

## 2022-06-03 VITALS
SYSTOLIC BLOOD PRESSURE: 122 MMHG | WEIGHT: 136.25 LBS | HEIGHT: 59 IN | DIASTOLIC BLOOD PRESSURE: 76 MMHG | BODY MASS INDEX: 27.47 KG/M2

## 2022-06-03 PROCEDURE — 81003 URINALYSIS AUTO W/O SCOPE: CPT | Mod: QW

## 2022-06-03 PROCEDURE — 0502F SUBSEQUENT PRENATAL CARE: CPT

## 2022-06-06 LAB
ALBUMIN SERPL ELPH-MCNC: 3.6 G/DL
ALP BLD-CCNC: 68 U/L
ALT SERPL-CCNC: 34 U/L
ANION GAP SERPL CALC-SCNC: 10 MMOL/L
AST SERPL-CCNC: 27 U/L
BASOPHILS # BLD AUTO: 0.04 K/UL
BASOPHILS NFR BLD AUTO: 0.3 %
BILIRUB SERPL-MCNC: 0.3 MG/DL
BUN SERPL-MCNC: 7 MG/DL
CALCIUM SERPL-MCNC: 9.3 MG/DL
CHLORIDE SERPL-SCNC: 106 MMOL/L
CO2 SERPL-SCNC: 20 MMOL/L
CREAT SERPL-MCNC: 0.49 MG/DL
EGFR: 132 ML/MIN/1.73M2
EOSINOPHIL # BLD AUTO: 0.29 K/UL
EOSINOPHIL NFR BLD AUTO: 2.3 %
GLUCOSE SERPL-MCNC: 97 MG/DL
HCT VFR BLD CALC: 35.9 %
HGB BLD-MCNC: 11.6 G/DL
IMM GRANULOCYTES NFR BLD AUTO: 0.7 %
LYMPHOCYTES # BLD AUTO: 1.65 K/UL
LYMPHOCYTES NFR BLD AUTO: 12.9 %
MAN DIFF?: NORMAL
MCHC RBC-ENTMCNC: 30 PG
MCHC RBC-ENTMCNC: 32.3 GM/DL
MCV RBC AUTO: 92.8 FL
MONOCYTES # BLD AUTO: 0.81 K/UL
MONOCYTES NFR BLD AUTO: 6.3 %
NEUTROPHILS # BLD AUTO: 9.94 K/UL
NEUTROPHILS NFR BLD AUTO: 77.5 %
PLATELET # BLD AUTO: 354 K/UL
POTASSIUM SERPL-SCNC: 4.1 MMOL/L
PROT SERPL-MCNC: 6.2 G/DL
RBC # BLD: 3.87 M/UL
RBC # FLD: 12.9 %
SODIUM SERPL-SCNC: 136 MMOL/L
WBC # FLD AUTO: 12.82 K/UL

## 2022-06-29 ENCOUNTER — NON-APPOINTMENT (OUTPATIENT)
Age: 27
End: 2022-06-29

## 2022-06-29 ENCOUNTER — APPOINTMENT (OUTPATIENT)
Dept: OBGYN | Facility: CLINIC | Age: 27
End: 2022-06-29

## 2022-06-29 VITALS
HEIGHT: 59 IN | BODY MASS INDEX: 28.22 KG/M2 | DIASTOLIC BLOOD PRESSURE: 66 MMHG | WEIGHT: 140 LBS | SYSTOLIC BLOOD PRESSURE: 112 MMHG

## 2022-06-29 PROCEDURE — 0502F SUBSEQUENT PRENATAL CARE: CPT

## 2022-06-29 PROCEDURE — 81003 URINALYSIS AUTO W/O SCOPE: CPT | Mod: QW

## 2022-06-30 LAB
ALBUMIN SERPL ELPH-MCNC: 3.5 G/DL
ALP BLD-CCNC: 87 U/L
ALT SERPL-CCNC: 16 U/L
ANION GAP SERPL CALC-SCNC: 12 MMOL/L
AST SERPL-CCNC: 15 U/L
BASOPHILS # BLD AUTO: 0.02 K/UL
BASOPHILS NFR BLD AUTO: 0.2 %
BILIRUB SERPL-MCNC: 0.3 MG/DL
BUN SERPL-MCNC: 7 MG/DL
CALCIUM SERPL-MCNC: 9.4 MG/DL
CHLORIDE SERPL-SCNC: 104 MMOL/L
CO2 SERPL-SCNC: 20 MMOL/L
CREAT SERPL-MCNC: 0.39 MG/DL
EGFR: 139 ML/MIN/1.73M2
EOSINOPHIL # BLD AUTO: 0.23 K/UL
EOSINOPHIL NFR BLD AUTO: 1.9 %
GLUCOSE 1H P 50 G GLC PO SERPL-MCNC: 123 MG/DL
GLUCOSE SERPL-MCNC: 134 MG/DL
HCT VFR BLD CALC: 35.2 %
HCV AB SER QL: NONREACTIVE
HCV S/CO RATIO: 0.09 S/CO
HGB BLD-MCNC: 11.3 G/DL
IMM GRANULOCYTES NFR BLD AUTO: 0.8 %
LYMPHOCYTES # BLD AUTO: 1.81 K/UL
LYMPHOCYTES NFR BLD AUTO: 15.1 %
MAN DIFF?: NORMAL
MCHC RBC-ENTMCNC: 29 PG
MCHC RBC-ENTMCNC: 32.1 GM/DL
MCV RBC AUTO: 90.5 FL
MONOCYTES # BLD AUTO: 0.74 K/UL
MONOCYTES NFR BLD AUTO: 6.2 %
NEUTROPHILS # BLD AUTO: 9.11 K/UL
NEUTROPHILS NFR BLD AUTO: 75.8 %
PLATELET # BLD AUTO: 435 K/UL
POTASSIUM SERPL-SCNC: 4.1 MMOL/L
PROT SERPL-MCNC: 6.2 G/DL
RBC # BLD: 3.89 M/UL
RBC # FLD: 12.6 %
SODIUM SERPL-SCNC: 135 MMOL/L
T PALLIDUM AB SER QL IA: NEGATIVE
WBC # FLD AUTO: 12 K/UL

## 2022-07-07 ENCOUNTER — APPOINTMENT (OUTPATIENT)
Dept: CARDIOLOGY | Facility: CLINIC | Age: 27
End: 2022-07-07

## 2022-07-07 DIAGNOSIS — Z87.898 PERSONAL HISTORY OF OTHER SPECIFIED CONDITIONS: ICD-10-CM

## 2022-07-07 PROCEDURE — 99204 OFFICE O/P NEW MOD 45 MIN: CPT | Mod: 95

## 2022-07-07 NOTE — DISCUSSION/SUMMARY
[FreeTextEntry1] : Ms. JAMAR FLORES 26 year old F  for [employer] currently GA 26 5/7 LMP 1/2/22, EDC 10/9/22 presents in with complaints of palpitations "  skipped beats and fluttering in chest lasting for few seconds associated with shortness of breath occurring thorough the day despite completely stopping drinking caffeine and maintaining optimal hydration.   \par \par # Palpitations: \par BP Stable 112/66 - 6/29/22 \par  Echocardiogram to assess the structural and valvular function.\par Event Monitor x 7 days \par Additionally, encouraged heart healthy diet and exercise as tolerated.\par Encouraged patient to continue healthy exercise and eating habits, focusing on a Mediterranean style of eating and aiming for the recommended 150 minutes per week of moderate physical activity.\par \par  Follow up in 4 weeks \par \par

## 2022-07-07 NOTE — HISTORY OF PRESENT ILLNESS
[Home] : at home, [unfilled] , at the time of the visit. [Other Location: e.g. Home (Enter Location, City,State)___] : at [unfilled] [Verbal consent obtained from patient] : the patient, [unfilled] [FreeTextEntry1] : Ms. JAMAR FLORES 26 year old F  for [employer] is here Jul 07, 2022 to establish care in the [facility] program. She carries a family medical history of HTN and no personal past medical history is currently GA 26 5/7 LMP 1/2/22, EDC 10/9/22 presents in with complaints of palpitations. She reports having a sensation of skipped beats and fluttering in chest lasting associated with shortness of breath for few seconds occurring thorough the day. she stopped drinking caffeine. Admits to drinking at least 4- 6 16 oz phuong spring water bottles.   \par Denies chest pain, dizziness, lightheadedness, or near syncope or syncope, orthopnea, PND and increasing lower extremity edema. \par \par # Palpitations: \par BP Stable 112/66 - 6/29/22 \par  Echocardiogram to assess the structural and valvular function.\par Event Monitor x 7 days \par Additionally, encouraged heart healthy diet and exercise as tolerated.\par Encouraged patient to continue healthy exercise and eating habits, focusing on a Mediterranean style of eating and aiming for the recommended 150 minutes per week of moderate physical activity.\par \par \par  Follow up in 4 weeks \par \par \par

## 2022-07-08 ENCOUNTER — NON-APPOINTMENT (OUTPATIENT)
Age: 27
End: 2022-07-08

## 2022-07-27 ENCOUNTER — APPOINTMENT (OUTPATIENT)
Dept: OBGYN | Facility: CLINIC | Age: 27
End: 2022-07-27

## 2022-07-27 VITALS
SYSTOLIC BLOOD PRESSURE: 116 MMHG | WEIGHT: 143.38 LBS | DIASTOLIC BLOOD PRESSURE: 75 MMHG | BODY MASS INDEX: 28.96 KG/M2

## 2022-07-27 PROCEDURE — 99080 SPECIAL REPORTS OR FORMS: CPT

## 2022-07-27 PROCEDURE — 0502F SUBSEQUENT PRENATAL CARE: CPT

## 2022-08-05 NOTE — HISTORY OF PRESENT ILLNESS
[FreeTextEntry1] : Ms. 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 She carries a family medical history of HTN and no personal past medical history is currently GA 26 5/7 LMP 1/2/22, EDC 10/9/22 presents in with complaints of palpitations. She reports having a sensation of skipped beats and fluttering in chest lasting associated with shortness of breath for few seconds occurring thorough the day. she stopped drinking caffeine. Admits to drinking at least 4- 6 16 oz phuong spring water bottles.   \par Denies chest pain, dizziness, lightheadedness, or near syncope or syncope, orthopnea, PND and increasing lower extremity edema. \par \par # Palpitations: \par BP Stable 112/66 - 6/29/22 \par  Echocardiogram to assess the structural and valvular function.\par Event Monitor x 7 days \par Additionally, encouraged heart healthy diet and exercise as tolerated.\par Encouraged patient to continue healthy exercise and eating habits, focusing on a Mediterranean style of eating and aiming for the recommended 150 minutes per week of moderate physical activity.\par \par \par  Follow up in 4 weeks \par \par \par

## 2022-08-05 NOTE — DISCUSSION/SUMMARY
[FreeTextEntry1] : Ms. JAMAR FLORES 26 year old F  for [employer] currently GA 26 5/7 LMP 1/2/22, EDC 10/9/22 presents in with complaints of palpitations "  skipped beats and fluttering in chest lasting for few seconds associated with shortness of breath occurring thorough the day despite completely stopping drinking caffeine and maintaining optimal hydration.   \par \par # Palpitations: \par BP Stable 112/66 - 6/29/22 \par  Echocardiogram to assess the structural and valvular function.\par Event Monitor x 7 days \par Additionally, encouraged heart healthy diet and exercise as tolerated.\par Encouraged patient to continue healthy exercise and eating habits, focusing on a Mediterranean style of eating and aiming for the recommended 150 minutes per week of moderate physical activity.\par \par  Follow up in 4 weeks \par \par

## 2022-08-08 ENCOUNTER — APPOINTMENT (OUTPATIENT)
Dept: CV DIAGNOSITCS | Facility: HOSPITAL | Age: 27
End: 2022-08-08

## 2022-08-08 ENCOUNTER — APPOINTMENT (OUTPATIENT)
Dept: CARDIOLOGY | Facility: CLINIC | Age: 27
End: 2022-08-08

## 2022-08-17 ENCOUNTER — APPOINTMENT (OUTPATIENT)
Dept: OBGYN | Facility: CLINIC | Age: 27
End: 2022-08-17

## 2022-08-17 VITALS — SYSTOLIC BLOOD PRESSURE: 98 MMHG | DIASTOLIC BLOOD PRESSURE: 48 MMHG | WEIGHT: 148 LBS | BODY MASS INDEX: 29.89 KG/M2

## 2022-08-17 PROCEDURE — 99080 SPECIAL REPORTS OR FORMS: CPT

## 2022-08-17 PROCEDURE — 90471 IMMUNIZATION ADMIN: CPT

## 2022-08-17 PROCEDURE — 90715 TDAP VACCINE 7 YRS/> IM: CPT

## 2022-08-17 PROCEDURE — 0502F SUBSEQUENT PRENATAL CARE: CPT

## 2022-09-01 ENCOUNTER — ASOB RESULT (OUTPATIENT)
Age: 27
End: 2022-09-01

## 2022-09-01 ENCOUNTER — APPOINTMENT (OUTPATIENT)
Dept: ANTEPARTUM | Facility: CLINIC | Age: 27
End: 2022-09-01
Payer: COMMERCIAL

## 2022-09-01 PROCEDURE — 76816 OB US FOLLOW-UP PER FETUS: CPT

## 2022-09-02 ENCOUNTER — NON-APPOINTMENT (OUTPATIENT)
Age: 27
End: 2022-09-02

## 2022-09-02 ENCOUNTER — OUTPATIENT (OUTPATIENT)
Dept: OUTPATIENT SERVICES | Facility: HOSPITAL | Age: 27
LOS: 1 days | End: 2022-09-02
Payer: COMMERCIAL

## 2022-09-02 ENCOUNTER — APPOINTMENT (OUTPATIENT)
Dept: OBGYN | Facility: CLINIC | Age: 27
End: 2022-09-02

## 2022-09-02 VITALS
DIASTOLIC BLOOD PRESSURE: 76 MMHG | SYSTOLIC BLOOD PRESSURE: 103 MMHG | TEMPERATURE: 98 F | RESPIRATION RATE: 18 BRPM | HEART RATE: 112 BPM

## 2022-09-02 VITALS — HEART RATE: 95 BPM | SYSTOLIC BLOOD PRESSURE: 106 MMHG | DIASTOLIC BLOOD PRESSURE: 62 MMHG

## 2022-09-02 VITALS — SYSTOLIC BLOOD PRESSURE: 108 MMHG | WEIGHT: 150 LBS | DIASTOLIC BLOOD PRESSURE: 64 MMHG | BODY MASS INDEX: 30.3 KG/M2

## 2022-09-02 DIAGNOSIS — Z3A.00 WEEKS OF GESTATION OF PREGNANCY NOT SPECIFIED: ICD-10-CM

## 2022-09-02 DIAGNOSIS — O26.899 OTHER SPECIFIED PREGNANCY RELATED CONDITIONS, UNSPECIFIED TRIMESTER: ICD-10-CM

## 2022-09-02 DIAGNOSIS — G44.009 CLUSTER HEADACHE SYNDROME, UNSPECIFIED, NOT INTRACTABLE: ICD-10-CM

## 2022-09-02 LAB
ALBUMIN SERPL ELPH-MCNC: 3.4 G/DL — SIGNIFICANT CHANGE UP (ref 3.3–5)
ALP SERPL-CCNC: 159 U/L — HIGH (ref 40–120)
ALT FLD-CCNC: 11 U/L — SIGNIFICANT CHANGE UP (ref 10–45)
ANION GAP SERPL CALC-SCNC: 10 MMOL/L — SIGNIFICANT CHANGE UP (ref 5–17)
APPEARANCE UR: CLEAR — SIGNIFICANT CHANGE UP
APTT BLD: 27.7 SEC — SIGNIFICANT CHANGE UP (ref 27.5–35.5)
AST SERPL-CCNC: 14 U/L — SIGNIFICANT CHANGE UP (ref 10–40)
BASOPHILS # BLD AUTO: 0.02 K/UL — SIGNIFICANT CHANGE UP (ref 0–0.2)
BASOPHILS NFR BLD AUTO: 0.2 % — SIGNIFICANT CHANGE UP (ref 0–2)
BILIRUB SERPL-MCNC: 0.4 MG/DL — SIGNIFICANT CHANGE UP (ref 0.2–1.2)
BILIRUB UR-MCNC: NEGATIVE — SIGNIFICANT CHANGE UP
BUN SERPL-MCNC: 4 MG/DL — LOW (ref 7–23)
CALCIUM SERPL-MCNC: 9.3 MG/DL — SIGNIFICANT CHANGE UP (ref 8.4–10.5)
CHLORIDE SERPL-SCNC: 104 MMOL/L — SIGNIFICANT CHANGE UP (ref 96–108)
CO2 SERPL-SCNC: 20 MMOL/L — LOW (ref 22–31)
COLOR SPEC: SIGNIFICANT CHANGE UP
CREAT ?TM UR-MCNC: 56 MG/DL — SIGNIFICANT CHANGE UP
CREAT SERPL-MCNC: 0.38 MG/DL — LOW (ref 0.5–1.3)
DIFF PNL FLD: NEGATIVE — SIGNIFICANT CHANGE UP
EGFR: 140 ML/MIN/1.73M2 — SIGNIFICANT CHANGE UP
EOSINOPHIL # BLD AUTO: 0.21 K/UL — SIGNIFICANT CHANGE UP (ref 0–0.5)
EOSINOPHIL NFR BLD AUTO: 1.8 % — SIGNIFICANT CHANGE UP (ref 0–6)
FIBRINOGEN PPP-MCNC: 924 MG/DL — HIGH (ref 330–520)
GLUCOSE SERPL-MCNC: 106 MG/DL — HIGH (ref 70–99)
GLUCOSE UR QL: ABNORMAL
HCT VFR BLD CALC: 34.6 % — SIGNIFICANT CHANGE UP (ref 34.5–45)
HGB BLD-MCNC: 11 G/DL — LOW (ref 11.5–15.5)
IMM GRANULOCYTES NFR BLD AUTO: 0.6 % — SIGNIFICANT CHANGE UP (ref 0–1.5)
INR BLD: 1.02 RATIO — SIGNIFICANT CHANGE UP (ref 0.88–1.16)
KETONES UR-MCNC: NEGATIVE — SIGNIFICANT CHANGE UP
LDH SERPL L TO P-CCNC: 164 U/L — SIGNIFICANT CHANGE UP (ref 50–242)
LEUKOCYTE ESTERASE UR-ACNC: NEGATIVE — SIGNIFICANT CHANGE UP
LYMPHOCYTES # BLD AUTO: 18.1 % — SIGNIFICANT CHANGE UP (ref 13–44)
LYMPHOCYTES # BLD AUTO: 2.09 K/UL — SIGNIFICANT CHANGE UP (ref 1–3.3)
MCHC RBC-ENTMCNC: 25.3 PG — LOW (ref 27–34)
MCHC RBC-ENTMCNC: 31.8 GM/DL — LOW (ref 32–36)
MCV RBC AUTO: 79.5 FL — LOW (ref 80–100)
MONOCYTES # BLD AUTO: 0.82 K/UL — SIGNIFICANT CHANGE UP (ref 0–0.9)
MONOCYTES NFR BLD AUTO: 7.1 % — SIGNIFICANT CHANGE UP (ref 2–14)
NEUTROPHILS # BLD AUTO: 8.35 K/UL — HIGH (ref 1.8–7.4)
NEUTROPHILS NFR BLD AUTO: 72.2 % — SIGNIFICANT CHANGE UP (ref 43–77)
NITRITE UR-MCNC: NEGATIVE — SIGNIFICANT CHANGE UP
NRBC # BLD: 0 /100 WBCS — SIGNIFICANT CHANGE UP (ref 0–0)
PH UR: 6.5 — SIGNIFICANT CHANGE UP (ref 5–8)
PLATELET # BLD AUTO: 470 K/UL — HIGH (ref 150–400)
POTASSIUM SERPL-MCNC: 4.1 MMOL/L — SIGNIFICANT CHANGE UP (ref 3.5–5.3)
POTASSIUM SERPL-SCNC: 4.1 MMOL/L — SIGNIFICANT CHANGE UP (ref 3.5–5.3)
PROT ?TM UR-MCNC: 15 MG/DL — HIGH (ref 0–12)
PROT ?TM UR-MCNC: 16 MG/DL — HIGH (ref 0–12)
PROT SERPL-MCNC: 6.8 G/DL — SIGNIFICANT CHANGE UP (ref 6–8.3)
PROT UR-MCNC: NEGATIVE — SIGNIFICANT CHANGE UP
PROT/CREAT UR-RTO: 0.3 RATIO — HIGH (ref 0–0.2)
PROTHROM AB SERPL-ACNC: 11.7 SEC — SIGNIFICANT CHANGE UP (ref 10.5–13.4)
RBC # BLD: 4.35 M/UL — SIGNIFICANT CHANGE UP (ref 3.8–5.2)
RBC # FLD: 13.7 % — SIGNIFICANT CHANGE UP (ref 10.3–14.5)
SODIUM SERPL-SCNC: 134 MMOL/L — LOW (ref 135–145)
SP GR SPEC: 1.01 — SIGNIFICANT CHANGE UP (ref 1.01–1.02)
URATE SERPL-MCNC: 3.4 MG/DL — SIGNIFICANT CHANGE UP (ref 2.5–7)
UROBILINOGEN FLD QL: NEGATIVE — SIGNIFICANT CHANGE UP
WBC # BLD: 11.56 K/UL — HIGH (ref 3.8–10.5)
WBC # FLD AUTO: 11.56 K/UL — HIGH (ref 3.8–10.5)

## 2022-09-02 PROCEDURE — G0463: CPT

## 2022-09-02 PROCEDURE — 80053 COMPREHEN METABOLIC PANEL: CPT

## 2022-09-02 PROCEDURE — 85610 PROTHROMBIN TIME: CPT

## 2022-09-02 PROCEDURE — 84156 ASSAY OF PROTEIN URINE: CPT

## 2022-09-02 PROCEDURE — 85025 COMPLETE CBC W/AUTO DIFF WBC: CPT

## 2022-09-02 PROCEDURE — 82570 ASSAY OF URINE CREATININE: CPT

## 2022-09-02 PROCEDURE — 83615 LACTATE (LD) (LDH) ENZYME: CPT

## 2022-09-02 PROCEDURE — 81003 URINALYSIS AUTO W/O SCOPE: CPT | Mod: QW

## 2022-09-02 PROCEDURE — 0502F SUBSEQUENT PRENATAL CARE: CPT

## 2022-09-02 PROCEDURE — 84550 ASSAY OF BLOOD/URIC ACID: CPT

## 2022-09-02 PROCEDURE — 81003 URINALYSIS AUTO W/O SCOPE: CPT

## 2022-09-02 PROCEDURE — 36415 COLL VENOUS BLD VENIPUNCTURE: CPT

## 2022-09-02 PROCEDURE — 85384 FIBRINOGEN ACTIVITY: CPT

## 2022-09-02 PROCEDURE — 85730 THROMBOPLASTIN TIME PARTIAL: CPT

## 2022-09-02 RX ORDER — METOCLOPRAMIDE HCL 10 MG
10 TABLET ORAL ONCE
Refills: 0 | Status: COMPLETED | OUTPATIENT
Start: 2022-09-02 | End: 2022-09-02

## 2022-09-02 RX ORDER — DIPHENHYDRAMINE HCL 50 MG
25 CAPSULE ORAL ONCE
Refills: 0 | Status: COMPLETED | OUTPATIENT
Start: 2022-09-02 | End: 2022-09-02

## 2022-09-02 RX ORDER — SODIUM CHLORIDE 9 MG/ML
1000 INJECTION, SOLUTION INTRAVENOUS ONCE
Refills: 0 | Status: COMPLETED | OUTPATIENT
Start: 2022-09-02 | End: 2022-09-02

## 2022-09-02 RX ORDER — ACETAMINOPHEN 500 MG
1000 TABLET ORAL ONCE
Refills: 0 | Status: COMPLETED | OUTPATIENT
Start: 2022-09-02 | End: 2022-09-02

## 2022-09-02 RX ADMIN — Medication 10 MILLIGRAM(S): at 12:31

## 2022-09-02 RX ADMIN — Medication 400 MILLIGRAM(S): at 12:29

## 2022-09-02 RX ADMIN — SODIUM CHLORIDE 1000 MILLILITER(S): 9 INJECTION, SOLUTION INTRAVENOUS at 12:20

## 2022-09-02 RX ADMIN — Medication 25 MILLIGRAM(S): at 12:38

## 2022-09-02 NOTE — OB PROVIDER TRIAGE NOTE - NSHPPHYSICALEXAM_GEN_ALL_CORE
Gen - NAD  CV - RRR  Pulm - breathing comfortably on room air  Abd - soft, gravid, nontender Gen - NAD  CV - RRR  Pulm - breathing comfortably on room air  Abd - soft, gravid, nontender  Ext - warm and well perfused

## 2022-09-02 NOTE — OB PROVIDER TRIAGE NOTE - NSOBPROVIDERNOTE_OBGYN_ALL_OB_FT
27yo  @34w5d with headache x2 days w/o significant relief from intermittent tylenol administration at home.  Patient with no history of elevated BP or prenatal complications.  Has a history of migraines at a much younger age.  BP 25yo  @34w5d with headache x2 days w/o significant relief from intermittent tylenol administration at home.  Patient with no history of elevated BP or prenatal complications.  Has a history of migraines at a much younger age.  /74 on arrival.  Fetal status reassuring on EFM.  Concern for PEC vs cluster headaches.    Plan:  -HELLP labs sent  -Tylenol/Benadryl/Reglan  -monitor BP  -continue to reassess    d/w Dr. Olegario Cox, PGY4 Alert and oriented, no focal deficits, no motor or sensory deficits.

## 2022-09-02 NOTE — OB PROVIDER TRIAGE NOTE - HISTORY OF PRESENT ILLNESS
27yo  @34w5d presents with headache since Wednesday.  Patient states headache has been getting worse since onset and she has minimal relief with tylenol (last taken yesterday night).  She reports migraines in her teens however states this feels worse.  Intermittent nausea.  Denies significant changes in vision, RUQ pain, SOB.  Denies contractions, vaginal bleeding, loss of fluids.  Fetal movement present.       PNC: URI in May, otherwise uncomplicated    Name of GYN Physician: Dr. Martin  OBHx: none    GynHx: fibroid (1cm anterior right); denies cysts, endometriosis, STI's, Abnormal pap smears   PMH: denies  PSH: dental implant  Meds: PNV  Allx: NKDA  Social History:  Denies smoking use, drug use, alcohol use.

## 2022-09-02 NOTE — OB RN TRIAGE NOTE - NSCTXPAIN_OBGYN_ALL_OB
[facility] Sierra Molina MD   ondansetron [facility]) Zenon Goodman M.D No

## 2022-09-04 ENCOUNTER — NON-APPOINTMENT (OUTPATIENT)
Age: 27
End: 2022-09-04

## 2022-09-04 ENCOUNTER — RESULT REVIEW (OUTPATIENT)
Age: 27
End: 2022-09-04

## 2022-09-04 ENCOUNTER — OUTPATIENT (OUTPATIENT)
Dept: OUTPATIENT SERVICES | Facility: HOSPITAL | Age: 27
LOS: 1 days | End: 2022-09-04
Payer: COMMERCIAL

## 2022-09-04 VITALS
RESPIRATION RATE: 18 BRPM | SYSTOLIC BLOOD PRESSURE: 105 MMHG | HEART RATE: 114 BPM | TEMPERATURE: 100 F | DIASTOLIC BLOOD PRESSURE: 59 MMHG

## 2022-09-04 VITALS — HEART RATE: 106 BPM | OXYGEN SATURATION: 99 %

## 2022-09-04 DIAGNOSIS — O26.899 OTHER SPECIFIED PREGNANCY RELATED CONDITIONS, UNSPECIFIED TRIMESTER: ICD-10-CM

## 2022-09-04 DIAGNOSIS — Z3A.00 WEEKS OF GESTATION OF PREGNANCY NOT SPECIFIED: ICD-10-CM

## 2022-09-04 LAB
ALBUMIN SERPL ELPH-MCNC: 3.4 G/DL — SIGNIFICANT CHANGE UP (ref 3.3–5)
ALP SERPL-CCNC: 152 U/L — HIGH (ref 40–120)
ALT FLD-CCNC: 12 U/L — SIGNIFICANT CHANGE UP (ref 10–45)
ANION GAP SERPL CALC-SCNC: 11 MMOL/L — SIGNIFICANT CHANGE UP (ref 5–17)
APPEARANCE UR: CLEAR — SIGNIFICANT CHANGE UP
APTT BLD: 26.1 SEC — LOW (ref 27.5–35.5)
AST SERPL-CCNC: 14 U/L — SIGNIFICANT CHANGE UP (ref 10–40)
BACTERIA # UR AUTO: NEGATIVE — SIGNIFICANT CHANGE UP
BASOPHILS # BLD AUTO: 0.02 K/UL — SIGNIFICANT CHANGE UP (ref 0–0.2)
BASOPHILS NFR BLD AUTO: 0.2 % — SIGNIFICANT CHANGE UP (ref 0–2)
BILIRUB SERPL-MCNC: 0.6 MG/DL — SIGNIFICANT CHANGE UP (ref 0.2–1.2)
BILIRUB UR-MCNC: NEGATIVE — SIGNIFICANT CHANGE UP
BUN SERPL-MCNC: 4 MG/DL — LOW (ref 7–23)
CALCIUM SERPL-MCNC: 9.6 MG/DL — SIGNIFICANT CHANGE UP (ref 8.4–10.5)
CHLORIDE SERPL-SCNC: 104 MMOL/L — SIGNIFICANT CHANGE UP (ref 96–108)
CO2 SERPL-SCNC: 20 MMOL/L — LOW (ref 22–31)
COLOR SPEC: YELLOW — SIGNIFICANT CHANGE UP
CREAT ?TM UR-MCNC: 97 MG/DL — SIGNIFICANT CHANGE UP
CREAT SERPL-MCNC: 0.45 MG/DL — LOW (ref 0.5–1.3)
DIFF PNL FLD: NEGATIVE — SIGNIFICANT CHANGE UP
EGFR: 134 ML/MIN/1.73M2 — SIGNIFICANT CHANGE UP
EOSINOPHIL # BLD AUTO: 0.12 K/UL — SIGNIFICANT CHANGE UP (ref 0–0.5)
EOSINOPHIL NFR BLD AUTO: 0.9 % — SIGNIFICANT CHANGE UP (ref 0–6)
EPI CELLS # UR: 5 /HPF — SIGNIFICANT CHANGE UP
FIBRINOGEN PPP-MCNC: 976 MG/DL — HIGH (ref 330–520)
GLUCOSE SERPL-MCNC: 95 MG/DL — SIGNIFICANT CHANGE UP (ref 70–99)
GLUCOSE UR QL: NEGATIVE — SIGNIFICANT CHANGE UP
HCT VFR BLD CALC: 34.3 % — LOW (ref 34.5–45)
HGB BLD-MCNC: 10.9 G/DL — LOW (ref 11.5–15.5)
HYALINE CASTS # UR AUTO: 1 /LPF — SIGNIFICANT CHANGE UP (ref 0–2)
IMM GRANULOCYTES NFR BLD AUTO: 0.5 % — SIGNIFICANT CHANGE UP (ref 0–1.5)
INR BLD: 1.05 RATIO — SIGNIFICANT CHANGE UP (ref 0.88–1.16)
KETONES UR-MCNC: NEGATIVE — SIGNIFICANT CHANGE UP
LDH SERPL L TO P-CCNC: 174 U/L — SIGNIFICANT CHANGE UP (ref 50–242)
LEUKOCYTE ESTERASE UR-ACNC: NEGATIVE — SIGNIFICANT CHANGE UP
LYMPHOCYTES # BLD AUTO: 1.88 K/UL — SIGNIFICANT CHANGE UP (ref 1–3.3)
LYMPHOCYTES # BLD AUTO: 14.7 % — SIGNIFICANT CHANGE UP (ref 13–44)
MCHC RBC-ENTMCNC: 25.2 PG — LOW (ref 27–34)
MCHC RBC-ENTMCNC: 31.8 GM/DL — LOW (ref 32–36)
MCV RBC AUTO: 79.4 FL — LOW (ref 80–100)
MONOCYTES # BLD AUTO: 0.74 K/UL — SIGNIFICANT CHANGE UP (ref 0–0.9)
MONOCYTES NFR BLD AUTO: 5.8 % — SIGNIFICANT CHANGE UP (ref 2–14)
NEUTROPHILS # BLD AUTO: 9.92 K/UL — HIGH (ref 1.8–7.4)
NEUTROPHILS NFR BLD AUTO: 77.9 % — HIGH (ref 43–77)
NITRITE UR-MCNC: NEGATIVE — SIGNIFICANT CHANGE UP
NRBC # BLD: 0 /100 WBCS — SIGNIFICANT CHANGE UP (ref 0–0)
PH UR: 6.5 — SIGNIFICANT CHANGE UP (ref 5–8)
PLATELET # BLD AUTO: 486 K/UL — HIGH (ref 150–400)
POTASSIUM SERPL-MCNC: 3.9 MMOL/L — SIGNIFICANT CHANGE UP (ref 3.5–5.3)
POTASSIUM SERPL-SCNC: 3.9 MMOL/L — SIGNIFICANT CHANGE UP (ref 3.5–5.3)
PROT ?TM UR-MCNC: 25 MG/DL — HIGH (ref 0–12)
PROT ?TM UR-MCNC: 28 MG/DL — HIGH (ref 0–12)
PROT SERPL-MCNC: 6.7 G/DL — SIGNIFICANT CHANGE UP (ref 6–8.3)
PROT UR-MCNC: ABNORMAL
PROT/CREAT UR-RTO: 0.3 RATIO — HIGH (ref 0–0.2)
PROTHROM AB SERPL-ACNC: 12.1 SEC — SIGNIFICANT CHANGE UP (ref 10.5–13.4)
RBC # BLD: 4.32 M/UL — SIGNIFICANT CHANGE UP (ref 3.8–5.2)
RBC # FLD: 14 % — SIGNIFICANT CHANGE UP (ref 10.3–14.5)
RBC CASTS # UR COMP ASSIST: 5 /HPF — HIGH (ref 0–4)
SODIUM SERPL-SCNC: 135 MMOL/L — SIGNIFICANT CHANGE UP (ref 135–145)
SP GR SPEC: 1.02 — SIGNIFICANT CHANGE UP (ref 1.01–1.02)
URATE SERPL-MCNC: 3.4 MG/DL — SIGNIFICANT CHANGE UP (ref 2.5–7)
UROBILINOGEN FLD QL: NEGATIVE — SIGNIFICANT CHANGE UP
WBC # BLD: 12.75 K/UL — HIGH (ref 3.8–10.5)
WBC # FLD AUTO: 12.75 K/UL — HIGH (ref 3.8–10.5)
WBC UR QL: 3 /HPF — SIGNIFICANT CHANGE UP (ref 0–5)

## 2022-09-04 PROCEDURE — 80053 COMPREHEN METABOLIC PANEL: CPT

## 2022-09-04 PROCEDURE — 85384 FIBRINOGEN ACTIVITY: CPT

## 2022-09-04 PROCEDURE — 70450 CT HEAD/BRAIN W/O DYE: CPT | Mod: MB

## 2022-09-04 PROCEDURE — 82570 ASSAY OF URINE CREATININE: CPT

## 2022-09-04 PROCEDURE — 81001 URINALYSIS AUTO W/SCOPE: CPT

## 2022-09-04 PROCEDURE — G0463: CPT

## 2022-09-04 PROCEDURE — 85025 COMPLETE CBC W/AUTO DIFF WBC: CPT

## 2022-09-04 PROCEDURE — 84156 ASSAY OF PROTEIN URINE: CPT

## 2022-09-04 PROCEDURE — 83615 LACTATE (LD) (LDH) ENZYME: CPT

## 2022-09-04 PROCEDURE — 59025 FETAL NON-STRESS TEST: CPT

## 2022-09-04 PROCEDURE — 85610 PROTHROMBIN TIME: CPT

## 2022-09-04 PROCEDURE — 84550 ASSAY OF BLOOD/URIC ACID: CPT

## 2022-09-04 PROCEDURE — 85730 THROMBOPLASTIN TIME PARTIAL: CPT

## 2022-09-04 PROCEDURE — 70450 CT HEAD/BRAIN W/O DYE: CPT | Mod: 26,MB

## 2022-09-04 RX ORDER — MAGNESIUM SULFATE 500 MG/ML
2 VIAL (ML) INJECTION ONCE
Refills: 0 | Status: COMPLETED | OUTPATIENT
Start: 2022-09-04 | End: 2022-09-04

## 2022-09-04 RX ORDER — ACETAMINOPHEN 500 MG
1000 TABLET ORAL ONCE
Refills: 0 | Status: COMPLETED | OUTPATIENT
Start: 2022-09-04 | End: 2022-09-04

## 2022-09-04 RX ORDER — SODIUM CHLORIDE 9 MG/ML
1000 INJECTION, SOLUTION INTRAVENOUS ONCE
Refills: 0 | Status: COMPLETED | OUTPATIENT
Start: 2022-09-04 | End: 2022-09-04

## 2022-09-04 RX ORDER — DIPHENHYDRAMINE HCL 50 MG
25 CAPSULE ORAL ONCE
Refills: 0 | Status: COMPLETED | OUTPATIENT
Start: 2022-09-04 | End: 2022-09-04

## 2022-09-04 RX ORDER — SUMATRIPTAN SUCCINATE 4 MG/.5ML
1 INJECTION, SOLUTION SUBCUTANEOUS ONCE
Refills: 0 | Status: DISCONTINUED | OUTPATIENT
Start: 2022-09-04 | End: 2022-09-04

## 2022-09-04 RX ORDER — METOCLOPRAMIDE HCL 10 MG
10 TABLET ORAL ONCE
Refills: 0 | Status: COMPLETED | OUTPATIENT
Start: 2022-09-04 | End: 2022-09-04

## 2022-09-04 RX ORDER — ACETAMINOPHEN 500 MG
975 TABLET ORAL ONCE
Refills: 0 | Status: COMPLETED | OUTPATIENT
Start: 2022-09-04 | End: 2022-09-04

## 2022-09-04 RX ADMIN — Medication 25 MILLIGRAM(S): at 18:29

## 2022-09-04 RX ADMIN — SODIUM CHLORIDE 1000 MILLILITER(S): 9 INJECTION, SOLUTION INTRAVENOUS at 12:50

## 2022-09-04 RX ADMIN — Medication 975 MILLIGRAM(S): at 12:53

## 2022-09-04 RX ADMIN — Medication 25 GRAM(S): at 18:27

## 2022-09-04 RX ADMIN — Medication 10 MILLIGRAM(S): at 13:01

## 2022-09-04 RX ADMIN — Medication 25 MILLIGRAM(S): at 12:54

## 2022-09-04 RX ADMIN — Medication 400 MILLIGRAM(S): at 18:50

## 2022-09-04 RX ADMIN — Medication 10 MILLIGRAM(S): at 18:32

## 2022-09-04 RX ADMIN — SODIUM CHLORIDE 1000 MILLILITER(S): 9 INJECTION, SOLUTION INTRAVENOUS at 20:06

## 2022-09-04 NOTE — CONSULT NOTE ADULT - SUBJECTIVE AND OBJECTIVE BOX
Neurology - Consult Note    -  Spectra: [phone] ([facility]), [phone] ([facility])  -    HPI: Patient JAMAR FLORES is a 26y (1995)  woman presented to the hospital with a constant headache that started 4 days prior to the current admission (). The patient endorsed the headache came suddenly and was a 10/10 in pain and was associated with some nausea. She describes the pain as starting either in her forehead or occiput and radiating anterior or posterior. Headache location did not correlate with position. She took Extra strength acetaminophen around the clock but did not experience any benefits leading onto . She came to the hospital and was given the IV formulation of the headache cocktail (benedryl, acetaminophen, and reglan) and experienced complete benefit and was instructed to take acetaminophen when she returned home. However, when she returned home, the headaches returned and did not go away with acetaminophen. On Saturday 9/3, the patient experienced bilateral blurry vision with colorful floaters in both eyes when she woke up. Given the symptoms, the patient decided to come to the hospital again today. The patient was provided the headache cocktail in pill form and endorsed her headache improving from 10/10 to 7/10. Neurology consulted for further headache management.         Review of Systems:    CONSTITUTIONAL: No fevers or chills  EYES AND ENT: Sees colored floaters from both eyes. Denies black spots    NECK: No pain or stiffness  RESPIRATORY: +shortness of breath  CARDIOVASCULAR: +palpitations  NEUROLOGICAL: +As stated in HPI above  All other review of systems is negative unless indicated above.    Allergies:      PMHx/PSHx/Family Hx: As above, otherwise see below   No pertinent past medical history        Social Hx:  No current use of tobacco, alcohol, or illicit drugs  Lives with      Medications:  MEDICATIONS  (STANDING):  diphenhydrAMINE Injectable 25 milliGRAM(s) IV Push once  magnesium sulfate  IVPB 2 Gram(s) IV Intermittent once  metoclopramide Injectable 10 milliGRAM(s) IV Push once  SUMAtriptan (20 milliGRAM(s)/actuation) Nasal Spray - Peds 1 Spray(s) Both Nostrils once    MEDICATIONS  (PRN):      Vitals:  T(C): 37.3 (22 @ 16:00), Max: 37.5 (22 @ 12:22)  HR: 118 (22 @ 18:06) (93 - 127)  BP: 115/62 (22 @ 17:57) (99/55 - 132/62)  RR: 18 (22 @ 13:04) (18 - 18)  SpO2: 98% (22 @ 18:06) (94% - 100%)    Physical Examination:   General - NAD, pleasant, cooperative, pregnant   Cardiovascular -no edema  Neurologic Exam:  Mental status - Awake, Alert, Oriented to person, place, and time. Speech fluent, repetition and naming intact. Follows simple and complex commands. Attention/concentration, recent and remote memory (including registration and recall), and fund of knowledge intact    Cranial nerves:  CN II: Visual fields are full to confrontation. F Pupils are 4 mm and briskly reactive to light (4 ---> 2). Visual acuity is 20/20 bilaterally.  CN III, IV, VI: EOMI, no nystagmus, no ptosis  CN V: Facial sensation is intact to pinprick in all 3 divisions bilaterally.  CN VII: Face is symmetric with normal eye closure and smile.  CN VII: Hearing is normal to rubbing fingers  CN IX, X: Palate elevates symmetrically. Phonation is normal.  CN XI: Head turning and shoulder shrug are intact  CN XII: Tongue is midline with normal movements and no atrophy.    Motor - Normal bulk and tone throughout.   Strength testing            Deltoid      Biceps      Triceps     Wrist Extension    Wrist Flexion     Interossei         R            5                 5               5                     5                              5                        5                 5  L             5                 5               5                     5                              5                        5                 5              Hip Flexion    Hip Extension    Knee Flexion    Knee Extension    Dorsiflexion    Plantar Flexion  R              5                           5                       5                           5                            5                          5  L              5                           5                        5                           5                            5                          5    Sensation - Light touch intact in fingers and toes     DTR's -             Biceps      Triceps     Brachioradialis      Patellar    Ankle    Toes/plantar response  R             2+             2+                  2+                       2+            2+                 Down  L              2+             2+                 2+                        2+           2+                 Down    Coordination -  There is no dysmetria on finger-to-nose and heel-knee-shin.    Gait and station - Gait not assessed given patient's pregnant condition. Patient endorsed being able to complete all ADLs      Labs:                        10.9   12.75 )-----------( 486      ( 04 Sep 2022 13:04 )             34.3         135  |  104  |  4<L>  ----------------------------<  95  3.9   |  20<L>  |  0.45<L>    Ca    9.6      04 Sep 2022 13:04    TPro  6.7  /  Alb  3.4  /  TBili  0.6  /  DBili  x   /  AST  14  /  ALT  12  /  AlkPhos  152<H>      CAPILLARY BLOOD GLUCOSE        LIVER FUNCTIONS - ( 04 Sep 2022 13:04 )  Alb: 3.4 g/dL / Pro: 6.7 g/dL / ALK PHOS: 152 U/L / ALT: 12 U/L / AST: 14 U/L / GGT: x             PT/INR - ( 04 Sep 2022 13:04 )   PT: 12.1 sec;   INR: 1.05 ratio         PTT - ( 04 Sep 2022 13:04 )  PTT:26.1 sec  CSF:                  Radiology:

## 2022-09-04 NOTE — OB PROVIDER TRIAGE NOTE - NSOBPROVIDERNOTE_OBGYN_ALL_OB_FT
27yo  @35w with headache x2 days w/o significant relief from tylenol administration at home. Upon arrival BPs normotensive and PMH significant for a history of migraines at a much younger age.  /57 on arrival. Fetal status reassuring on EFM.  Differential includes sPEC vs migraine vs cluster headaches vs intracranial pathology.    - Acetaminophen, Reglan and Benadryl   - STAT HELLP labs  - 1L bolus   - BP monitoring   - EFM/[place]  - Re-evaluate symptoms and consider CT Head or Neuro Cx if persistent    d/w Dr. Beritn Jiménez PGY3 27yo  @35w with headache x2 days w/o significant relief from tylenol administration at home. Upon arrival BPs normotensive and PMH significant for a history of migraines at a much younger age.  /57 on arrival. Fetal status reassuring on EFM.  Differential includes sPEC vs migraine vs cluster headaches vs intracranial pathology.    - Acetaminophen, Reglan and Benadryl   - STAT HELLP labs  - 1L bolus   - BP monitoring   - EFM/[place]  - Re-evaluate symptoms and consider CT Head or Neuro Cx if persistent    d/w Dr. Bertin Jiménez PGY3    Patient seen at bedside with persistent headache unrelieved with medication. BPs normotensive, HELLP labs reviewed and wnl, PC 0.25.     - CT Head noncontrast ordered  - consider Neurology consult if symptoms persist    d/w Dr. Bertin Jiménez PGY3 27yo  @35w with headache x2 days w/o significant relief from tylenol administration at home. Upon arrival BPs normotensive and PMH significant for a history of migraines at a much younger age.  /57 on arrival. Fetal status reassuring on EFM.  Differential includes sPEC vs migraine vs cluster headaches vs intracranial pathology.    - Acetaminophen, Reglan and Benadryl   - STAT HELLP labs  - 1L bolus   - BP monitoring   - EFM/[place]  - Re-evaluate symptoms and consider CT Head or Neuro Cx if persistent    d/w Dr. Bertin Jiménez PGY3    Patient seen at bedside with persistent headache unrelieved with medication. BPs normotensive, HELLP labs reviewed and wnl, PC 0.25.     - CT Head noncontrast ordered  - consider Neurology consult if symptoms persist    d/w Dr. Bertin Jiménez PGY3      Patient seen at bedside with improvement in headache from 10/10 to 7/10 in severity. Transport at bedside to take down to CT    - f/u CT Head  - Neurology consult placed   - Next dose of Tylenol, Reglan and Benadryl 7p  - further recs pending    d/w Dr. Bertin Jiménez PGY3 25yo  @35w with headache x2 days w/o significant relief from tylenol administration at home. Upon arrival BPs normotensive and PMH significant for a history of migraines at a much younger age.  /57 on arrival. Fetal status reassuring on EFM.  Differential includes sPEC vs migraine vs cluster headaches vs intracranial pathology.    - Acetaminophen, Reglan and Benadryl   - STAT HELLP labs  - 1L bolus   - BP monitoring   - EFM/[place]  - Re-evaluate symptoms and consider CT Head or Neuro Cx if persistent    d/w Dr. Bertin Jiménez PGY3    Patient seen at bedside with persistent headache unrelieved with medication. BPs normotensive, HELLP labs reviewed and wnl, PC 0.25.     - CT Head noncontrast ordered  - consider Neurology consult if symptoms persist    d/w Dr. Bertin Jiménez PGY3    --------------------------------------------------------ADDENDUM--------------------------------------------------------  Patient seen at bedside with improvement in headache from 10/10 to 7/10 in severity. Transport at bedside to take down to CT    - f/u CT Head  - Neurology consult placed   - Next dose of Tylenol, Reglan and Benadryl 7p  - further recs pending    d/w Dr. Bertin Jiménez PGY3      --------------------------------------------------------ADDENDUM--------------------------------------------------------  - Pt seen at bedside  - She reports improvement in HA, most relief she's had in the past 5 days  - CT Head with neg read  - One 2 min decel w/ spontaneous resolution, now s/p 4hours of reactive NST  - Pt to follow up with private OB + Neuro referral (Dr. Dale Heart)  - Call your OBGYN w/ any vaginal bleeding, contractions, leakage of fluid.  Call your OBGYN w/ any decreased fetal movement.  Follow up with your OBGYN within 1 week.     D/W Dr. [place]  Merged with [facility] PGY3

## 2022-09-04 NOTE — OB PROVIDER TRIAGE NOTE - NSHPPHYSICALEXAM_GEN_ALL_CORE
Vital Signs Last 24 Hrs  T(C): 37.5 (04 Sep 2022 12:22), Max: 37.5 (04 Sep 2022 12:22)  T(F): 99.5 (04 Sep 2022 12:22), Max: 99.5 (04 Sep 2022 12:22)  HR: 109 (04 Sep 2022 12:49) (107 - 123)  BP: 105/59 (04 Sep 2022 12:22) (105/59 - 105/59)  BP(mean): --  RR: 18 (04 Sep 2022 12:22) (18 - 18)  SpO2: 99% (04 Sep 2022 12:49) (96% - 99%)    Gen NAD  CV Regular  Pulm comfortable on RA  Abd soft nontender   Ext nontender, no edema present   FHT Cat 1  [place] uterine irritability

## 2022-09-04 NOTE — OB PROVIDER TRIAGE NOTE - HISTORY OF PRESENT ILLNESS
25yo  @35w presents with headache since Friday night. She was recently in Triage on Friday for headache x2 days that was relieved with Tylenol, Reglan, and Benadryl. She reports that the headache returned to 10/10 in severity that night and has been unrelieved by Tylenol. It is primarily bothersome in the occipital region and travels across her left side to the front of her head. She reports that nothing makes it better and it is worse with movement (bending or lifting) with associated photophobia. She reports seeing black spots in her visual field last night, now resolved at this time. Denies fevers, N/V, CP, palpitations, abdominal pain, and edema. Denies ctx, LOF, VB, and dec FM.     PNC: URI in May, otherwise uncomplicated    Name of GYN Physician: Dr. Martin  OBHx: none    GynHx: fibroid (1cm anterior right); denies cysts, endometriosis, STI's, Abnormal pap smears   PMH: Migraines as a child  PSH: dental implant  Meds: PNV  Allx: NKDA  Social History:  Denies smoking use, drug use, alcohol use.

## 2022-09-04 NOTE — OB PROVIDER TRIAGE NOTE - ADDITIONAL INSTRUCTIONS
Call your OBGYN w/ any vaginal bleeding, contractions, leakage of fluid.  Call your OBGYN w/ any decreased fetal movement.  Follow up with your OBGYN within 1 week.

## 2022-09-04 NOTE — OB RN TRIAGE NOTE - NS_GESTAGE_OBGYN_ALL_OB_FT
0159 Patient arrived ambulatory from home with c/o ROM around midnight. States infrequent mild contractions. Denies vaginal bleeding. States positive fetal movement. 0215 Nitrazine positive 0315 OBSBAR given to KHUSHBOO Shin RN 
 35w

## 2022-09-04 NOTE — CONSULT NOTE ADULT - ASSESSMENT
ASSESSMENT     26y (1995)  woman @ 35w presented to the hospital with a constant headache refractory to acetaminophen. Pending CT read for stroke but demonstrating no focal symptoms. Also concerning for preeclampsia but patient has been normotensive during her stay. Has been provided headache cocktail with some benefit, now restarted during current admission.      IMPRESSION   Tension headache 2/2 pregnancy, resolving on headache cocktail     RECOMMENDATION   Abortive therapies:  [] recommend migraine medications: acetaminophen 1000mg IV q8 PRN, reglan 10mg q8h PRN, benadryl 25mg IV q8h PRN, IV hydration, Mg 2g IV x1. Can convert to orals when patient is discharged.   [] check for magnesium levels   [] recommend solumedrol 125mg IV PRN if it does not affect current obgyn care   [] can consider sumatriptan nasal spray if carried by pharmacy   [] Emphasize to patient that it would be difficult for her pain to completely go away and that it would be a gradual process.   [] Encourage avoidance of common migraine triggers (artificial sweeteners, food preservative (MSG), aged cheese, skipping meals, change in wake/sleep cycle and intense physical exertion)  [] Encourage lifestyle changes that help migraine: physical exercise, fixed meal time, fixed sleep/wake cycle, avoid smoking and highly caffeinated products     Patient to be discussed with Neurology attending, Dr. Rudi Richmond and be rounded upon in the AM. If patient were to be discharged, please contact consult resident spectra [phone].    ASSESSMENT     26y (1995)  woman @ 35w presented to the hospital with a constant headache refractory to acetaminophen. Pending CT read for stroke but demonstrating no focal symptoms. Also concerning for preeclampsia but patient has been normotensive during her stay. Has been provided headache cocktail with some benefit, now restarted during current admission.      IMPRESSION   Tension headache 2/2 pregnancy, resolving on headache cocktail     RECOMMENDATION     [] recommend migraine medications: acetaminophen 1000mg IV q8 PRN, reglan 10mg q8h PRN, benadryl 25mg IV q8h PRN, IV hydration, Mg 2g IV x1. Can convert to orals when patient is discharged.   [] check for magnesium levels   [] recommend solumedrol 125mg IV PRN if it does not affect current obgyn care   [] can consider sumatriptan nasal spray if carried by pharmacy   [] Emphasize to patient that it would be difficult for her pain to completely go away and that it would be a gradual process.   [] Encourage avoidance of common migraine triggers (artificial sweeteners, food preservative (MSG), aged cheese, skipping meals, change in wake/sleep cycle and intense physical exertion)  [] Encourage lifestyle changes that help migraine: physical exercise, fixed meal time, fixed sleep/wake cycle, avoid smoking and highly caffeinated products     Patient to be discussed with Neurology attending, Dr. Rudi Richmond and be rounded upon in the AM. If patient were to be discharged, please contact consult resident spectra [phone].    ASSESSMENT     26y (1995)  woman @ 35w presented to the hospital with a constant headache refractory to acetaminophen. Pending CT read for stroke but demonstrating no focal symptoms. Also concerning for preeclampsia but patient has been normotensive during her stay. Has been provided headache cocktail with some benefit, now restarted during current admission.      IMPRESSION   Tension headache 2/2 pregnancy, resolving on headache cocktail     RECOMMENDATION     [] recommend standing headache cocktail: acetaminophen 1000mg IV q8 , reglan 10mg q8h , benadryl 25mg IV q8h, IV hydration, Mg 2g IV x1. Can convert to orals when patient is discharged.   [] check for magnesium levels   [] recommend solumedrol 125mg IV PRN if it does not affect current obgyn care   [] can consider sumatriptan nasal spray if carried by pharmacy   [] Emphasize to patient that it would be difficult for her pain to completely go away and that it would be a gradual process.   [] Encourage avoidance of common migraine triggers (artificial sweeteners, food preservative (MSG), aged cheese, skipping meals, change in wake/sleep cycle and intense physical exertion)  [] Encourage lifestyle changes that help migraine: physical exercise, fixed meal time, fixed sleep/wake cycle, avoid smoking and highly caffeinated products     Patient to be discussed with Neurology attending, Dr. Rudi Richmond and be rounded upon in the AM. If patient were to be discharged, please contact consult resident spectra [phone].    ASSESSMENT     26y (1995)  woman @ 35w presented to the hospital with a constant headache refractory to acetaminophen. Pending CT read for stroke but demonstrating no focal symptoms. Also concerning for preeclampsia but patient has been normotensive during her stay. Has been provided headache cocktail with some benefit, now restarted during current admission.      IMPRESSION   Tension headache 2/2 pregnancy, resolving on headache cocktail     RECOMMENDATION     [] recommend standing headache cocktail: acetaminophen 1000mg IV q8 , reglan 10mg q8h , benadryl 25mg IV q8h, IV hydration, Mg 2g IV x1. Try to give all at the same time. Can convert to orals when patient is discharged.   [] check for magnesium levels   [] recommend solumedrol 125mg IV PRN if it does not affect current obgyn care   [] can consider sumatriptan nasal spray if carried by pharmacy   [] Emphasize to patient that it would be difficult for her pain to completely go away and that it would be a gradual process.   [] Encourage avoidance of common migraine triggers (artificial sweeteners, food preservative (MSG), aged cheese, skipping meals, change in wake/sleep cycle and intense physical exertion)  [] Encourage lifestyle changes that help migraine: physical exercise, fixed meal time, fixed sleep/wake cycle, avoid smoking and highly caffeinated products     Patient to be discussed with Neurology attending, Dr. Rudi Richmond and be rounded upon in the AM. If patient were to be discharged, please contact consult resident spectra [phone].    ASSESSMENT     26y (1995)  woman @ 35w presented to the hospital with a constant headache refractory to acetaminophen. Pending CT read for stroke but demonstrating no focal symptoms. Also concerning for preeclampsia but patient has been normotensive during her stay. Has been provided headache cocktail with some benefit, now restarted during current admission.      IMPRESSION   Tension headache 2/2 pregnancy, resolving on headache cocktail     RECOMMENDATION     [] recommend standing headache cocktail: acetaminophen 1000mg IV q8 , reglan 10mg q8h , benadryl 25mg IV q8h, IV hydration, Mg 2g IV x1. Try to give all at the same time. Can convert to orals when patient is discharged.   [] check for magnesium levels   [] recommend solumedrol 125mg IV PRN if it does not affect current obgyn care   [] can consider sumatriptan nasal spray if carried by pharmacy   [] Emphasize to patient that it would be difficult for her pain to completely go away and that it would be a gradual process.   [] Encourage avoidance of common migraine triggers (artificial sweeteners, food preservative (MSG), aged cheese, skipping meals, change in wake/sleep cycle and intense physical exertion)  [] Encourage lifestyle changes that help migraine: physical exercise, fixed meal time, fixed sleep/wake cycle, avoid smoking and highly caffeinated products       ATTENDING ADDENDUM:  Patient was discharged from the hospital prior to case being seen or discussed with me. She is currently 35 weeks pregnant. I called the patient myself and clarified that headache did begin suddenly at ~5/10 in severity and reached 10/10 within 2-3 hours. She reported movement made the headache worse but no specific positional component. Headache currently 5/10 in severity and slowly responding to Tylenol. Told patient if headache suddenly and severely worsens, develops positional component, or has new focal neurologic symptoms to return to the ED immediately for further work-up and treatment. Patient verbalized agreement and understanding.    Patient to be discussed with Neurology attending, Dr. Rudi Richmond and be rounded upon in the AM. If patient were to be discharged, please contact consult resident spectra [phone].

## 2022-09-09 ENCOUNTER — NON-APPOINTMENT (OUTPATIENT)
Age: 27
End: 2022-09-09

## 2022-09-12 ENCOUNTER — APPOINTMENT (OUTPATIENT)
Dept: OBGYN | Facility: CLINIC | Age: 27
End: 2022-09-12

## 2022-09-12 VITALS
HEIGHT: 59 IN | DIASTOLIC BLOOD PRESSURE: 75 MMHG | WEIGHT: 157.25 LBS | SYSTOLIC BLOOD PRESSURE: 113 MMHG | BODY MASS INDEX: 31.7 KG/M2

## 2022-09-12 PROCEDURE — 0502F SUBSEQUENT PRENATAL CARE: CPT

## 2022-09-19 LAB
GP B STREP DNA SPEC QL NAA+PROBE: NORMAL
GP B STREP DNA SPEC QL NAA+PROBE: NOT DETECTED
SOURCE GBS: NORMAL

## 2022-09-23 ENCOUNTER — NON-APPOINTMENT (OUTPATIENT)
Age: 27
End: 2022-09-23

## 2022-09-23 ENCOUNTER — APPOINTMENT (OUTPATIENT)
Dept: OBGYN | Facility: CLINIC | Age: 27
End: 2022-09-23

## 2022-09-23 VITALS
SYSTOLIC BLOOD PRESSURE: 120 MMHG | WEIGHT: 160 LBS | BODY MASS INDEX: 32.25 KG/M2 | DIASTOLIC BLOOD PRESSURE: 60 MMHG | HEIGHT: 59 IN

## 2022-09-23 PROCEDURE — 81003 URINALYSIS AUTO W/O SCOPE: CPT | Mod: QW

## 2022-09-23 PROCEDURE — 0502F SUBSEQUENT PRENATAL CARE: CPT

## 2022-09-30 ENCOUNTER — APPOINTMENT (OUTPATIENT)
Dept: OBGYN | Facility: CLINIC | Age: 27
End: 2022-09-30

## 2022-09-30 VITALS
WEIGHT: 162 LBS | HEIGHT: 59 IN | DIASTOLIC BLOOD PRESSURE: 64 MMHG | BODY MASS INDEX: 32.66 KG/M2 | SYSTOLIC BLOOD PRESSURE: 110 MMHG

## 2022-09-30 PROCEDURE — 81003 URINALYSIS AUTO W/O SCOPE: CPT | Mod: QW

## 2022-09-30 PROCEDURE — 0502F SUBSEQUENT PRENATAL CARE: CPT

## 2022-10-03 ENCOUNTER — APPOINTMENT (OUTPATIENT)
Dept: OBGYN | Facility: CLINIC | Age: 27
End: 2022-10-03

## 2022-10-03 VITALS
DIASTOLIC BLOOD PRESSURE: 60 MMHG | HEIGHT: 55 IN | BODY MASS INDEX: 37.72 KG/M2 | SYSTOLIC BLOOD PRESSURE: 100 MMHG | WEIGHT: 163 LBS

## 2022-10-03 DIAGNOSIS — Z34.92 ENCOUNTER FOR SUPERVISION OF NORMAL PREGNANCY, UNSPECIFIED, SECOND TRIMESTER: ICD-10-CM

## 2022-10-03 PROCEDURE — 0502F SUBSEQUENT PRENATAL CARE: CPT

## 2022-10-03 PROCEDURE — 81003 URINALYSIS AUTO W/O SCOPE: CPT | Mod: QW

## 2022-10-10 ENCOUNTER — ASOB RESULT (OUTPATIENT)
Age: 27
End: 2022-10-10

## 2022-10-10 ENCOUNTER — APPOINTMENT (OUTPATIENT)
Dept: ANTEPARTUM | Facility: CLINIC | Age: 27
End: 2022-10-10
Payer: COMMERCIAL

## 2022-10-10 ENCOUNTER — APPOINTMENT (OUTPATIENT)
Dept: OBGYN | Facility: CLINIC | Age: 27
End: 2022-10-10

## 2022-10-10 VITALS
BODY MASS INDEX: 33.77 KG/M2 | HEIGHT: 59 IN | SYSTOLIC BLOOD PRESSURE: 125 MMHG | DIASTOLIC BLOOD PRESSURE: 88 MMHG | WEIGHT: 167.5 LBS

## 2022-10-10 LAB
ALBUMIN SERPL ELPH-MCNC: 2.9 G/DL
ALP BLD-CCNC: 217 U/L
ALT SERPL-CCNC: 6 U/L
ANION GAP SERPL CALC-SCNC: 12 MMOL/L
AST SERPL-CCNC: 15 U/L
BASOPHILS # BLD AUTO: 0.03 K/UL
BASOPHILS NFR BLD AUTO: 0.3 %
BILIRUB SERPL-MCNC: 0.4 MG/DL
BUN SERPL-MCNC: 6 MG/DL
CALCIUM SERPL-MCNC: 8.9 MG/DL
CHLORIDE SERPL-SCNC: 109 MMOL/L
CO2 SERPL-SCNC: 19 MMOL/L
CREAT SERPL-MCNC: 0.5 MG/DL
EGFR: 130 ML/MIN/1.73M2
EOSINOPHIL # BLD AUTO: 0.1 K/UL
EOSINOPHIL NFR BLD AUTO: 1 %
FIBRINOGEN PPP COAG.DERIVED-MCNC: 784 MG/DL
GLUCOSE SERPL-MCNC: 88 MG/DL
HCT VFR BLD CALC: 36.5 %
HGB BLD-MCNC: 11.1 G/DL
IMM GRANULOCYTES NFR BLD AUTO: 0.4 %
LDH SERPL-CCNC: 203 U/L
LYMPHOCYTES # BLD AUTO: 2.28 K/UL
LYMPHOCYTES NFR BLD AUTO: 23.6 %
MAN DIFF?: NORMAL
MCHC RBC-ENTMCNC: 23.9 PG
MCHC RBC-ENTMCNC: 30.4 GM/DL
MCV RBC AUTO: 78.5 FL
MONOCYTES # BLD AUTO: 0.63 K/UL
MONOCYTES NFR BLD AUTO: 6.5 %
NEUTROPHILS # BLD AUTO: 6.6 K/UL
NEUTROPHILS NFR BLD AUTO: 68.2 %
PLATELET # BLD AUTO: 454 K/UL
POTASSIUM SERPL-SCNC: 4.7 MMOL/L
PROT SERPL-MCNC: 6 G/DL
RBC # BLD: 4.65 M/UL
RBC # FLD: 16.9 %
SODIUM SERPL-SCNC: 139 MMOL/L
URATE SERPL-MCNC: 4.6 MG/DL
WBC # FLD AUTO: 9.68 K/UL

## 2022-10-10 PROCEDURE — 76816 OB US FOLLOW-UP PER FETUS: CPT | Mod: 59

## 2022-10-10 PROCEDURE — 76818 FETAL BIOPHYS PROFILE W/NST: CPT

## 2022-10-10 PROCEDURE — 0502F SUBSEQUENT PRENATAL CARE: CPT

## 2022-10-10 PROCEDURE — 81003 URINALYSIS AUTO W/O SCOPE: CPT | Mod: QW

## 2022-10-12 ENCOUNTER — NON-APPOINTMENT (OUTPATIENT)
Age: 27
End: 2022-10-12

## 2022-10-12 ENCOUNTER — INPATIENT (INPATIENT)
Facility: HOSPITAL | Age: 27
LOS: 3 days | Discharge: HOME | End: 2022-10-16
Attending: OBSTETRICS & GYNECOLOGY | Admitting: OBSTETRICS & GYNECOLOGY
Payer: COMMERCIAL

## 2022-10-12 VITALS
HEART RATE: 100 BPM | OXYGEN SATURATION: 99 % | SYSTOLIC BLOOD PRESSURE: 129 MMHG | TEMPERATURE: 98 F | DIASTOLIC BLOOD PRESSURE: 78 MMHG | RESPIRATION RATE: 18 BRPM

## 2022-10-12 DIAGNOSIS — Z34.80 ENCOUNTER FOR SUPERVISION OF OTHER NORMAL PREGNANCY, UNSPECIFIED TRIMESTER: ICD-10-CM

## 2022-10-12 DIAGNOSIS — O26.899 OTHER SPECIFIED PREGNANCY RELATED CONDITIONS, UNSPECIFIED TRIMESTER: ICD-10-CM

## 2022-10-12 DIAGNOSIS — Z3A.00 WEEKS OF GESTATION OF PREGNANCY NOT SPECIFIED: ICD-10-CM

## 2022-10-12 LAB
ALBUMIN SERPL ELPH-MCNC: 3.1 G/DL — LOW (ref 3.3–5)
ALP SERPL-CCNC: 211 U/L — HIGH (ref 40–120)
ALT FLD-CCNC: 10 U/L — SIGNIFICANT CHANGE UP (ref 10–45)
ANION GAP SERPL CALC-SCNC: 14 MMOL/L — SIGNIFICANT CHANGE UP (ref 5–17)
APPEARANCE UR: ABNORMAL
APTT BLD: 27 SEC — LOW (ref 27.5–35.5)
AST SERPL-CCNC: 13 U/L — SIGNIFICANT CHANGE UP (ref 10–40)
BACTERIA # UR AUTO: NEGATIVE — SIGNIFICANT CHANGE UP
BASOPHILS # BLD AUTO: 0.02 K/UL — SIGNIFICANT CHANGE UP (ref 0–0.2)
BASOPHILS NFR BLD AUTO: 0.2 % — SIGNIFICANT CHANGE UP (ref 0–2)
BILIRUB SERPL-MCNC: 0.6 MG/DL — SIGNIFICANT CHANGE UP (ref 0.2–1.2)
BILIRUB UR-MCNC: NEGATIVE — SIGNIFICANT CHANGE UP
BLD GP AB SCN SERPL QL: NEGATIVE — SIGNIFICANT CHANGE UP
BUN SERPL-MCNC: 6 MG/DL — LOW (ref 7–23)
CALCIUM SERPL-MCNC: 9 MG/DL — SIGNIFICANT CHANGE UP (ref 8.4–10.5)
CHLORIDE SERPL-SCNC: 104 MMOL/L — SIGNIFICANT CHANGE UP (ref 96–108)
CO2 SERPL-SCNC: 19 MMOL/L — LOW (ref 22–31)
COLOR SPEC: YELLOW — SIGNIFICANT CHANGE UP
CREAT ?TM UR-MCNC: 94 MG/DL — SIGNIFICANT CHANGE UP
CREAT SERPL-MCNC: 0.47 MG/DL — LOW (ref 0.5–1.3)
CREAT SPEC-SCNC: 35 MG/DL
CREAT/PROT UR: 0.9 RATIO
DIFF PNL FLD: ABNORMAL
EGFR: 132 ML/MIN/1.73M2 — SIGNIFICANT CHANGE UP
EOSINOPHIL # BLD AUTO: 0.09 K/UL — SIGNIFICANT CHANGE UP (ref 0–0.5)
EOSINOPHIL NFR BLD AUTO: 1 % — SIGNIFICANT CHANGE UP (ref 0–6)
EPI CELLS # UR: 6 /HPF — HIGH
FIBRINOGEN PPP-MCNC: 860 MG/DL — HIGH (ref 330–520)
GLUCOSE SERPL-MCNC: 102 MG/DL — HIGH (ref 70–99)
GLUCOSE UR QL: NEGATIVE — SIGNIFICANT CHANGE UP
HCT VFR BLD CALC: 34.1 % — LOW (ref 34.5–45)
HGB BLD-MCNC: 10.6 G/DL — LOW (ref 11.5–15.5)
HYALINE CASTS # UR AUTO: 6 /LPF — HIGH (ref 0–2)
IMM GRANULOCYTES NFR BLD AUTO: 0.4 % — SIGNIFICANT CHANGE UP (ref 0–0.9)
INR BLD: 0.96 RATIO — SIGNIFICANT CHANGE UP (ref 0.88–1.16)
KETONES UR-MCNC: NEGATIVE — SIGNIFICANT CHANGE UP
LDH SERPL L TO P-CCNC: 202 U/L — SIGNIFICANT CHANGE UP (ref 50–242)
LEUKOCYTE ESTERASE UR-ACNC: NEGATIVE — SIGNIFICANT CHANGE UP
LYMPHOCYTES # BLD AUTO: 1.81 K/UL — SIGNIFICANT CHANGE UP (ref 1–3.3)
LYMPHOCYTES # BLD AUTO: 19.9 % — SIGNIFICANT CHANGE UP (ref 13–44)
MCHC RBC-ENTMCNC: 23.8 PG — LOW (ref 27–34)
MCHC RBC-ENTMCNC: 31.1 GM/DL — LOW (ref 32–36)
MCV RBC AUTO: 76.5 FL — LOW (ref 80–100)
MONOCYTES # BLD AUTO: 0.51 K/UL — SIGNIFICANT CHANGE UP (ref 0–0.9)
MONOCYTES NFR BLD AUTO: 5.6 % — SIGNIFICANT CHANGE UP (ref 2–14)
NEUTROPHILS # BLD AUTO: 6.63 K/UL — SIGNIFICANT CHANGE UP (ref 1.8–7.4)
NEUTROPHILS NFR BLD AUTO: 72.9 % — SIGNIFICANT CHANGE UP (ref 43–77)
NITRITE UR-MCNC: NEGATIVE — SIGNIFICANT CHANGE UP
NRBC # BLD: 0 /100 WBCS — SIGNIFICANT CHANGE UP (ref 0–0)
PH UR: 7 — SIGNIFICANT CHANGE UP (ref 5–8)
PLATELET # BLD AUTO: 401 K/UL — HIGH (ref 150–400)
POTASSIUM SERPL-MCNC: 4 MMOL/L — SIGNIFICANT CHANGE UP (ref 3.5–5.3)
POTASSIUM SERPL-SCNC: 4 MMOL/L — SIGNIFICANT CHANGE UP (ref 3.5–5.3)
PROT ?TM UR-MCNC: 147 MG/DL — HIGH (ref 0–12)
PROT SERPL-MCNC: 6.5 G/DL — SIGNIFICANT CHANGE UP (ref 6–8.3)
PROT UR-MCNC: 100 — SIGNIFICANT CHANGE UP
PROT UR-MCNC: 30 MG/DL
PROT/CREAT UR-RTO: 1.6 RATIO — HIGH (ref 0–0.2)
PROTHROM AB SERPL-ACNC: 11 SEC — SIGNIFICANT CHANGE UP (ref 10.5–13.4)
RBC # BLD: 4.46 M/UL — SIGNIFICANT CHANGE UP (ref 3.8–5.2)
RBC # FLD: 16.5 % — HIGH (ref 10.3–14.5)
RBC CASTS # UR COMP ASSIST: 3 /HPF — SIGNIFICANT CHANGE UP (ref 0–4)
RH IG SCN BLD-IMP: POSITIVE — SIGNIFICANT CHANGE UP
SARS-COV-2 RNA SPEC QL NAA+PROBE: SIGNIFICANT CHANGE UP
SODIUM SERPL-SCNC: 137 MMOL/L — SIGNIFICANT CHANGE UP (ref 135–145)
SP GR SPEC: 1.02 — SIGNIFICANT CHANGE UP (ref 1.01–1.02)
URATE SERPL-MCNC: 4.5 MG/DL — SIGNIFICANT CHANGE UP (ref 2.5–7)
UROBILINOGEN FLD QL: NEGATIVE — SIGNIFICANT CHANGE UP
WBC # BLD: 9.1 K/UL — SIGNIFICANT CHANGE UP (ref 3.8–10.5)
WBC # FLD AUTO: 9.1 K/UL — SIGNIFICANT CHANGE UP (ref 3.8–10.5)
WBC UR QL: 3 /HPF — SIGNIFICANT CHANGE UP (ref 0–5)

## 2022-10-12 RX ORDER — OXYTOCIN 10 UNIT/ML
333.33 VIAL (ML) INJECTION
Qty: 20 | Refills: 0 | Status: DISCONTINUED | OUTPATIENT
Start: 2022-10-12 | End: 2022-10-16

## 2022-10-12 RX ORDER — SODIUM CHLORIDE 9 MG/ML
1000 INJECTION, SOLUTION INTRAVENOUS
Refills: 0 | Status: DISCONTINUED | OUTPATIENT
Start: 2022-10-12 | End: 2022-10-14

## 2022-10-12 RX ORDER — CHLORHEXIDINE GLUCONATE 213 G/1000ML
1 SOLUTION TOPICAL ONCE
Refills: 0 | Status: DISCONTINUED | OUTPATIENT
Start: 2022-10-12 | End: 2022-10-14

## 2022-10-12 RX ORDER — SODIUM CHLORIDE 9 MG/ML
1000 INJECTION, SOLUTION INTRAVENOUS ONCE
Refills: 0 | Status: DISCONTINUED | OUTPATIENT
Start: 2022-10-12 | End: 2022-10-16

## 2022-10-12 RX ORDER — ACETAMINOPHEN 500 MG
975 TABLET ORAL ONCE
Refills: 0 | Status: COMPLETED | OUTPATIENT
Start: 2022-10-12 | End: 2022-10-12

## 2022-10-12 RX ORDER — INFLUENZA VIRUS VACCINE 15; 15; 15; 15 UG/.5ML; UG/.5ML; UG/.5ML; UG/.5ML
0.5 SUSPENSION INTRAMUSCULAR ONCE
Refills: 0 | Status: DISCONTINUED | OUTPATIENT
Start: 2022-10-12 | End: 2022-10-16

## 2022-10-12 RX ORDER — ONDANSETRON 8 MG/1
4 TABLET, FILM COATED ORAL ONCE
Refills: 0 | Status: COMPLETED | OUTPATIENT
Start: 2022-10-12 | End: 2022-10-12

## 2022-10-12 RX ORDER — CITRIC ACID/SODIUM CITRATE 300-500 MG
15 SOLUTION, ORAL ORAL EVERY 6 HOURS
Refills: 0 | Status: DISCONTINUED | OUTPATIENT
Start: 2022-10-12 | End: 2022-10-14

## 2022-10-12 RX ADMIN — Medication 975 MILLIGRAM(S): at 18:23

## 2022-10-12 RX ADMIN — SODIUM CHLORIDE 125 MILLILITER(S): 9 INJECTION, SOLUTION INTRAVENOUS at 18:23

## 2022-10-12 RX ADMIN — ONDANSETRON 4 MILLIGRAM(S): 8 TABLET, FILM COATED ORAL at 18:24

## 2022-10-12 NOTE — OB RN PATIENT PROFILE - FALL HARM RISK - UNIVERSAL INTERVENTIONS
Bed in lowest position, wheels locked, appropriate side rails in place/Call bell, personal items and telephone in reach/Instruct patient to call for assistance before getting out of bed or chair/Non-slip footwear when patient is out of bed/[place] to call system/Physically safe environment - no spills, clutter or unnecessary equipment/Purposeful Proactive Rounding/Room/bathroom lighting operational, light cord in reach

## 2022-10-12 NOTE — OB PROVIDER H&P - ASSESSMENT
A/P:  27y  @40wks and 3 days gestation presenting for elective IOL. To note, patient has newly found proteinuria (P/Cr .9) but does not currently meet criteria for PEC. +FM. GBS -. EFW 3700g  -Admit to L&D  -Routine labs  -HELLP labs, P/Cr ratio to be sent  -Continue to monitor BP's  -Tylenol and zofran to be given for HA with accompanying nausea  -EFM/[place]  -NPO, IVF, Bicitra  -IOL with buccal cytotec  -Anesthesia consult  -Anticipate   D/w Dr. Olegario Vazquez PA-C A/P:  27y  @40wks and 3 days gestation presenting for elective IOL. To note, patient has newly found proteinuria (P/Cr .9) but does not currently meet criteria for PEC. +FM. GBS -. EFW 3700g  -Admit to L&D  -Routine labs  -HELLP labs, P/Cr ratio to be sent  -Continue to monitor BP's  -Tylenol and zofran to be given for HA with accompanying nausea  -EFM/[place]  -NPO, IVF, Bicitra  -IOL with buccal cytotec  -Anesthesia consult  -Anticipate   D/w Dr. Olegario Vazquez PA-C      ATTYO P0 with uncomplicated PNC presents for elective induction of labor at term. Of note, pt's P/C ratio is 0.9 and she has some mild BP elevations since arrival to L&D,. likely mild PEC.  FHR is Cat I  EFW 3700g  Cervix is unfavorable for induction  -Admit  -Buccal cytotec for cervical ripening  -Epidural PRN

## 2022-10-12 NOTE — OB PROVIDER H&P - HISTORY OF PRESENT ILLNESS
PA Note:  27y  @40wks and 3 days gestation presenting for elective IOL. To note, patient also noted to have newly found proteinuria (P/Cr .9). BP's have been WNL and she does not meet criteria for PEC currently. Patient states she has a mild frontal headache that she has not taken Tylenol for with accompanying nausea. She denies visual disturbances, epigastric pain or swelling. She admits to irregular ctx's. She denies LOF or VB. PNC otherwise uncomplicated. +FM. GBS -. EFW 3700g done by ultrasound.    POBHx: Denies  PGYNHx: 1 anterior right fibroid measuring 1x.76x1. Denies ovarian cysts, abnormal pap smears, STD's  PMHx: Denies  Medications: PNV  Allergies: NKDA  PSHx: Denies  Social Hx: Denies etoh/tobacco/drug use. Denies anxiety/depression    Vital Signs Last 24 Hrs  T(C): 36.8 (12 Oct 2022 17:28), Max: 36.8 (12 Oct 2022 17:10)  T(F): 98.24 (12 Oct 2022 17:28), Max: 98.24 (12 Oct 2022 17:28)  HR: 99 (12 Oct 2022 17:43) (99 - 108)  BP: 113/68 (12 Oct 2022 17:36) (113/68 - 129/78)  BP(mean): --  RR: 18 (12 Oct 2022 17:10) (18 - 18)  SpO2: 97% (12 Oct 2022 17:43) (94% - 99%)    Parameters below as of 12 Oct 2022 17:10  Patient On (Oxygen Delivery Method): room air    General: NAD, A&Ox3  CV: RRR  Lungs: CTA b/l  Abdomen: Soft, NT, gravid    VE: 0/long/-3  Bedside sono: vertex presentation  EFM: 155/moderate variability/+Accels/no decels  [place]: Irritability

## 2022-10-12 NOTE — OB PROVIDER H&P - NSHPPHYSICALEXAM_GEN_ALL_CORE
Vital Signs Last 24 Hrs  T(C): 36.8 (12 Oct 2022 17:28), Max: 36.8 (12 Oct 2022 17:10)  T(F): 98.24 (12 Oct 2022 17:28), Max: 98.24 (12 Oct 2022 17:28)  HR: 99 (12 Oct 2022 17:43) (99 - 108)  BP: 113/68 (12 Oct 2022 17:36) (113/68 - 129/78)  BP(mean): --  RR: 18 (12 Oct 2022 17:10) (18 - 18)  SpO2: 97% (12 Oct 2022 17:43) (94% - 99%)    Parameters below as of 12 Oct 2022 17:10  Patient On (Oxygen Delivery Method): room air    General: NAD, A&Ox3  CV: RRR  Lungs: CTA b/l  Abdomen: Soft, NT, gravid

## 2022-10-13 ENCOUNTER — TRANSCRIPTION ENCOUNTER (OUTPATIENT)
Age: 27
End: 2022-10-13

## 2022-10-13 ENCOUNTER — APPOINTMENT (OUTPATIENT)
Dept: ANTEPARTUM | Facility: CLINIC | Age: 27
End: 2022-10-13

## 2022-10-13 ENCOUNTER — NON-APPOINTMENT (OUTPATIENT)
Age: 27
End: 2022-10-13

## 2022-10-13 LAB
ALBUMIN SERPL ELPH-MCNC: 2.7 G/DL — LOW (ref 3.3–5)
ALP SERPL-CCNC: 203 U/L — HIGH (ref 40–120)
ALT FLD-CCNC: 9 U/L — LOW (ref 10–45)
ANION GAP SERPL CALC-SCNC: 12 MMOL/L — SIGNIFICANT CHANGE UP (ref 5–17)
APTT BLD: 27.5 SEC — SIGNIFICANT CHANGE UP (ref 27.5–35.5)
AST SERPL-CCNC: 17 U/L — SIGNIFICANT CHANGE UP (ref 10–40)
BILIRUB SERPL-MCNC: 0.9 MG/DL — SIGNIFICANT CHANGE UP (ref 0.2–1.2)
BUN SERPL-MCNC: 5 MG/DL — LOW (ref 7–23)
CALCIUM SERPL-MCNC: 8.3 MG/DL — LOW (ref 8.4–10.5)
CHLORIDE SERPL-SCNC: 106 MMOL/L — SIGNIFICANT CHANGE UP (ref 96–108)
CO2 SERPL-SCNC: 20 MMOL/L — LOW (ref 22–31)
COVID-19 SPIKE DOMAIN AB INTERP: POSITIVE
COVID-19 SPIKE DOMAIN ANTIBODY RESULT: >250 U/ML — HIGH
CREAT SERPL-MCNC: 0.56 MG/DL — SIGNIFICANT CHANGE UP (ref 0.5–1.3)
EGFR: 127 ML/MIN/1.73M2 — SIGNIFICANT CHANGE UP
FIBRINOGEN PPP-MCNC: 862 MG/DL — HIGH (ref 330–520)
GLUCOSE SERPL-MCNC: 107 MG/DL — HIGH (ref 70–99)
INR BLD: 0.97 RATIO — SIGNIFICANT CHANGE UP (ref 0.88–1.16)
LDH SERPL L TO P-CCNC: 238 U/L — SIGNIFICANT CHANGE UP (ref 50–242)
POTASSIUM SERPL-MCNC: 3.7 MMOL/L — SIGNIFICANT CHANGE UP (ref 3.5–5.3)
POTASSIUM SERPL-SCNC: 3.7 MMOL/L — SIGNIFICANT CHANGE UP (ref 3.5–5.3)
PROT SERPL-MCNC: 5.8 G/DL — LOW (ref 6–8.3)
PROTHROM AB SERPL-ACNC: 11.2 SEC — SIGNIFICANT CHANGE UP (ref 10.5–13.4)
SARS-COV-2 IGG+IGM SERPL QL IA: >250 U/ML — HIGH
SARS-COV-2 IGG+IGM SERPL QL IA: POSITIVE
SODIUM SERPL-SCNC: 138 MMOL/L — SIGNIFICANT CHANGE UP (ref 135–145)
T PALLIDUM AB TITR SER: NEGATIVE — SIGNIFICANT CHANGE UP
URATE SERPL-MCNC: 5.2 MG/DL — SIGNIFICANT CHANGE UP (ref 2.5–7)

## 2022-10-13 RX ORDER — NALOXONE HYDROCHLORIDE 4 MG/.1ML
0.1 SPRAY NASAL
Refills: 0 | Status: DISCONTINUED | OUTPATIENT
Start: 2022-10-13 | End: 2022-10-15

## 2022-10-13 RX ORDER — AMPICILLIN TRIHYDRATE 250 MG
2 CAPSULE ORAL EVERY 6 HOURS
Refills: 0 | Status: DISCONTINUED | OUTPATIENT
Start: 2022-10-14 | End: 2022-10-15

## 2022-10-13 RX ORDER — GENTAMICIN SULFATE 40 MG/ML
350 VIAL (ML) INJECTION ONCE
Refills: 0 | Status: COMPLETED | OUTPATIENT
Start: 2022-10-13 | End: 2022-10-14

## 2022-10-13 RX ORDER — AMPICILLIN TRIHYDRATE 250 MG
CAPSULE ORAL
Refills: 0 | Status: DISCONTINUED | OUTPATIENT
Start: 2022-10-13 | End: 2022-10-15

## 2022-10-13 RX ORDER — NIFEDIPINE 30 MG
30 TABLET, EXTENDED RELEASE 24 HR ORAL DAILY
Refills: 0 | Status: DISCONTINUED | OUTPATIENT
Start: 2022-10-13 | End: 2022-10-16

## 2022-10-13 RX ORDER — MAGNESIUM SULFATE 500 MG/ML
4 VIAL (ML) INJECTION ONCE
Refills: 0 | Status: COMPLETED | OUTPATIENT
Start: 2022-10-13 | End: 2022-10-13

## 2022-10-13 RX ORDER — OXYCODONE HYDROCHLORIDE 5 MG/1
5 TABLET ORAL
Refills: 0 | Status: DISCONTINUED | OUTPATIENT
Start: 2022-10-13 | End: 2022-10-13

## 2022-10-13 RX ORDER — ONDANSETRON 8 MG/1
4 TABLET, FILM COATED ORAL ONCE
Refills: 0 | Status: COMPLETED | OUTPATIENT
Start: 2022-10-13 | End: 2022-10-13

## 2022-10-13 RX ORDER — AMPICILLIN TRIHYDRATE 250 MG
2 CAPSULE ORAL ONCE
Refills: 0 | Status: COMPLETED | OUTPATIENT
Start: 2022-10-13 | End: 2022-10-13

## 2022-10-13 RX ORDER — DEXAMETHASONE 0.5 MG/5ML
4 ELIXIR ORAL EVERY 6 HOURS
Refills: 0 | Status: DISCONTINUED | OUTPATIENT
Start: 2022-10-13 | End: 2022-10-15

## 2022-10-13 RX ORDER — SODIUM CHLORIDE 9 MG/ML
500 INJECTION, SOLUTION INTRAVENOUS ONCE
Refills: 0 | Status: COMPLETED | OUTPATIENT
Start: 2022-10-13 | End: 2022-10-13

## 2022-10-13 RX ORDER — ACETAMINOPHEN 500 MG
1000 TABLET ORAL ONCE
Refills: 0 | Status: COMPLETED | OUTPATIENT
Start: 2022-10-13 | End: 2022-10-13

## 2022-10-13 RX ORDER — OXYTOCIN 10 UNIT/ML
VIAL (ML) INJECTION
Qty: 30 | Refills: 0 | Status: DISCONTINUED | OUTPATIENT
Start: 2022-10-13 | End: 2022-10-14

## 2022-10-13 RX ORDER — MORPHINE SULFATE 50 MG/1
2 CAPSULE, EXTENDED RELEASE ORAL ONCE
Refills: 0 | Status: DISCONTINUED | OUTPATIENT
Start: 2022-10-13 | End: 2022-10-15

## 2022-10-13 RX ORDER — MAGNESIUM SULFATE 500 MG/ML
2 VIAL (ML) INJECTION
Qty: 40 | Refills: 0 | Status: DISCONTINUED | OUTPATIENT
Start: 2022-10-13 | End: 2022-10-14

## 2022-10-13 RX ORDER — MAGNESIUM SULFATE 500 MG/ML
4 VIAL (ML) INJECTION ONCE
Refills: 0 | Status: DISCONTINUED | OUTPATIENT
Start: 2022-10-13 | End: 2022-10-14

## 2022-10-13 RX ORDER — ONDANSETRON 8 MG/1
4 TABLET, FILM COATED ORAL EVERY 6 HOURS
Refills: 0 | Status: DISCONTINUED | OUTPATIENT
Start: 2022-10-13 | End: 2022-10-15

## 2022-10-13 RX ORDER — NALBUPHINE HYDROCHLORIDE 10 MG/ML
2.5 INJECTION, SOLUTION INTRAMUSCULAR; INTRAVENOUS; SUBCUTANEOUS EVERY 6 HOURS
Refills: 0 | Status: DISCONTINUED | OUTPATIENT
Start: 2022-10-13 | End: 2022-10-15

## 2022-10-13 RX ADMIN — Medication 2 MILLIUNIT(S)/MIN: at 14:18

## 2022-10-13 RX ADMIN — ONDANSETRON 4 MILLIGRAM(S): 8 TABLET, FILM COATED ORAL at 19:11

## 2022-10-13 RX ADMIN — Medication 300 GRAM(S): at 20:43

## 2022-10-13 RX ADMIN — Medication 200 GRAM(S): at 21:01

## 2022-10-13 RX ADMIN — Medication 15 MILLILITER(S): at 00:46

## 2022-10-13 RX ADMIN — Medication 50 GM/HR: at 21:05

## 2022-10-13 RX ADMIN — SODIUM CHLORIDE 500 MILLILITER(S): 9 INJECTION, SOLUTION INTRAVENOUS at 20:23

## 2022-10-13 RX ADMIN — Medication 400 MILLIGRAM(S): at 22:47

## 2022-10-13 NOTE — OB PROVIDER IHI INDUCTION/AUGMENTATION NOTE - NS_GESTAGE_OBGYN_ALL_OB_FT
Refill requested for:       zolpidem (Ambien) 10 MG tablet    Last refill:   12/20/2021  (20#, no refills)    Last office visit: 12/17/2021      Scheduled office visit: none    Medication can not be filled by protocol. Physician authorization required.  
40w3d
40w3d

## 2022-10-13 NOTE — OB PROVIDER LABOR PROGRESS NOTE - NSVAGINALEXAM_OBGYN_ALL_OB_DT
13-Oct-2022 08:02
13-Oct-2022 11:05
13-Oct-2022 13:32
13-Oct-2022 20:45
13-Oct-2022 02:00
13-Oct-2022 22:25

## 2022-10-14 LAB
ANISOCYTOSIS BLD QL: SLIGHT — SIGNIFICANT CHANGE UP
ANISOCYTOSIS BLD QL: SLIGHT — SIGNIFICANT CHANGE UP
APTT BLD: 23.9 SEC — LOW (ref 27.5–35.5)
BASOPHILS # BLD AUTO: 0 K/UL — SIGNIFICANT CHANGE UP (ref 0–0.2)
BASOPHILS # BLD AUTO: 0 K/UL — SIGNIFICANT CHANGE UP (ref 0–0.2)
BASOPHILS NFR BLD AUTO: 0 % — SIGNIFICANT CHANGE UP (ref 0–2)
BASOPHILS NFR BLD AUTO: 0 % — SIGNIFICANT CHANGE UP (ref 0–2)
BURR CELLS BLD QL SMEAR: PRESENT — SIGNIFICANT CHANGE UP
DACRYOCYTES BLD QL SMEAR: SLIGHT — SIGNIFICANT CHANGE UP
DACRYOCYTES BLD QL SMEAR: SLIGHT — SIGNIFICANT CHANGE UP
ELLIPTOCYTES BLD QL SMEAR: SLIGHT — SIGNIFICANT CHANGE UP
ELLIPTOCYTES BLD QL SMEAR: SLIGHT — SIGNIFICANT CHANGE UP
EOSINOPHIL # BLD AUTO: 0 K/UL — SIGNIFICANT CHANGE UP (ref 0–0.5)
EOSINOPHIL # BLD AUTO: 0 K/UL — SIGNIFICANT CHANGE UP (ref 0–0.5)
EOSINOPHIL NFR BLD AUTO: 0 % — SIGNIFICANT CHANGE UP (ref 0–6)
EOSINOPHIL NFR BLD AUTO: 0 % — SIGNIFICANT CHANGE UP (ref 0–6)
GIANT PLATELETS BLD QL SMEAR: PRESENT — SIGNIFICANT CHANGE UP
HCT VFR BLD CALC: 29.5 % — LOW (ref 34.5–45)
HCT VFR BLD CALC: 33.3 % — LOW (ref 34.5–45)
HGB BLD-MCNC: 10.4 G/DL — LOW (ref 11.5–15.5)
HGB BLD-MCNC: 9.3 G/DL — LOW (ref 11.5–15.5)
HYPOCHROMIA BLD QL: SLIGHT — SIGNIFICANT CHANGE UP
LYMPHOCYTES # BLD AUTO: 0.83 K/UL — LOW (ref 1–3.3)
LYMPHOCYTES # BLD AUTO: 0.88 K/UL — LOW (ref 1–3.3)
LYMPHOCYTES # BLD AUTO: 3.4 % — LOW (ref 13–44)
LYMPHOCYTES # BLD AUTO: 3.5 % — LOW (ref 13–44)
MACROCYTES BLD QL: SLIGHT — SIGNIFICANT CHANGE UP
MACROCYTES BLD QL: SLIGHT — SIGNIFICANT CHANGE UP
MAGNESIUM SERPL-MCNC: 4 MG/DL — HIGH (ref 1.6–2.6)
MAGNESIUM SERPL-MCNC: 5.4 MG/DL — HIGH (ref 1.6–2.6)
MAGNESIUM SERPL-MCNC: 5.7 MG/DL — HIGH (ref 1.6–2.6)
MAGNESIUM SERPL-MCNC: 5.7 MG/DL — HIGH (ref 1.6–2.6)
MANUAL SMEAR VERIFICATION: SIGNIFICANT CHANGE UP
MANUAL SMEAR VERIFICATION: SIGNIFICANT CHANGE UP
MCHC RBC-ENTMCNC: 23.8 PG — LOW (ref 27–34)
MCHC RBC-ENTMCNC: 23.9 PG — LOW (ref 27–34)
MCHC RBC-ENTMCNC: 31.2 GM/DL — LOW (ref 32–36)
MCHC RBC-ENTMCNC: 31.5 GM/DL — LOW (ref 32–36)
MCV RBC AUTO: 75.4 FL — LOW (ref 80–100)
MCV RBC AUTO: 76.6 FL — LOW (ref 80–100)
MICROCYTES BLD QL: SLIGHT — SIGNIFICANT CHANGE UP
MICROCYTES BLD QL: SLIGHT — SIGNIFICANT CHANGE UP
MONOCYTES # BLD AUTO: 0.41 K/UL — SIGNIFICANT CHANGE UP (ref 0–0.9)
MONOCYTES # BLD AUTO: 2.02 K/UL — HIGH (ref 0–0.9)
MONOCYTES NFR BLD AUTO: 1.7 % — LOW (ref 2–14)
MONOCYTES NFR BLD AUTO: 7.8 % — SIGNIFICANT CHANGE UP (ref 2–14)
NEUTROPHILS # BLD AUTO: 22.59 K/UL — HIGH (ref 1.8–7.4)
NEUTROPHILS # BLD AUTO: 23.01 K/UL — HIGH (ref 1.8–7.4)
NEUTROPHILS NFR BLD AUTO: 88.8 % — HIGH (ref 43–77)
NEUTROPHILS NFR BLD AUTO: 94.8 % — HIGH (ref 43–77)
OVALOCYTES BLD QL SMEAR: SLIGHT — SIGNIFICANT CHANGE UP
PLAT MORPH BLD: ABNORMAL
PLAT MORPH BLD: ABNORMAL
PLATELET # BLD AUTO: 382 K/UL — SIGNIFICANT CHANGE UP (ref 150–400)
PLATELET # BLD AUTO: 403 K/UL — HIGH (ref 150–400)
POLYCHROMASIA BLD QL SMEAR: SLIGHT — SIGNIFICANT CHANGE UP
POLYCHROMASIA BLD QL SMEAR: SLIGHT — SIGNIFICANT CHANGE UP
RBC # BLD: 3.91 M/UL — SIGNIFICANT CHANGE UP (ref 3.8–5.2)
RBC # BLD: 4.35 M/UL — SIGNIFICANT CHANGE UP (ref 3.8–5.2)
RBC # FLD: 17.1 % — HIGH (ref 10.3–14.5)
RBC # FLD: 17.2 % — HIGH (ref 10.3–14.5)
RBC BLD AUTO: ABNORMAL
RBC BLD AUTO: ABNORMAL
SCHISTOCYTES BLD QL AUTO: SLIGHT — SIGNIFICANT CHANGE UP
TARGETS BLD QL SMEAR: SLIGHT — SIGNIFICANT CHANGE UP
WBC # BLD: 23.83 K/UL — HIGH (ref 3.8–10.5)
WBC # BLD: 25.91 K/UL — HIGH (ref 3.8–10.5)
WBC # FLD AUTO: 23.83 K/UL — HIGH (ref 3.8–10.5)
WBC # FLD AUTO: 25.91 K/UL — HIGH (ref 3.8–10.5)

## 2022-10-14 PROCEDURE — 59510 CESAREAN DELIVERY: CPT | Mod: U9

## 2022-10-14 PROCEDURE — 88307 TISSUE EXAM BY PATHOLOGIST: CPT | Mod: 26

## 2022-10-14 RX ORDER — KETOROLAC TROMETHAMINE 30 MG/ML
30 SYRINGE (ML) INJECTION EVERY 6 HOURS
Refills: 0 | Status: DISCONTINUED | OUTPATIENT
Start: 2022-10-14 | End: 2022-10-15

## 2022-10-14 RX ORDER — ACETAMINOPHEN 500 MG
975 TABLET ORAL
Refills: 0 | Status: DISCONTINUED | OUTPATIENT
Start: 2022-10-14 | End: 2022-10-16

## 2022-10-14 RX ORDER — MAGNESIUM HYDROXIDE 400 MG/1
30 TABLET, CHEWABLE ORAL
Refills: 0 | Status: DISCONTINUED | OUTPATIENT
Start: 2022-10-14 | End: 2022-10-16

## 2022-10-14 RX ORDER — SIMETHICONE 80 MG/1
80 TABLET, CHEWABLE ORAL EVERY 4 HOURS
Refills: 0 | Status: DISCONTINUED | OUTPATIENT
Start: 2022-10-14 | End: 2022-10-16

## 2022-10-14 RX ORDER — IBUPROFEN 200 MG
600 TABLET ORAL EVERY 6 HOURS
Refills: 0 | Status: COMPLETED | OUTPATIENT
Start: 2022-10-15 | End: 2023-09-13

## 2022-10-14 RX ORDER — MAGNESIUM SULFATE 500 MG/ML
2 VIAL (ML) INJECTION
Qty: 40 | Refills: 0 | Status: DISCONTINUED | OUTPATIENT
Start: 2022-10-14 | End: 2022-10-16

## 2022-10-14 RX ORDER — OXYCODONE HYDROCHLORIDE 5 MG/1
5 TABLET ORAL
Refills: 0 | Status: COMPLETED | OUTPATIENT
Start: 2022-10-14 | End: 2022-10-21

## 2022-10-14 RX ORDER — HEPARIN SODIUM 5000 [USP'U]/ML
5000 INJECTION INTRAVENOUS; SUBCUTANEOUS EVERY 12 HOURS
Refills: 0 | Status: DISCONTINUED | OUTPATIENT
Start: 2022-10-14 | End: 2022-10-16

## 2022-10-14 RX ORDER — LABETALOL HCL 100 MG
20 TABLET ORAL ONCE
Refills: 0 | Status: COMPLETED | OUTPATIENT
Start: 2022-10-14 | End: 2022-10-14

## 2022-10-14 RX ORDER — METOCLOPRAMIDE HCL 10 MG
5 TABLET ORAL ONCE
Refills: 0 | Status: DISCONTINUED | OUTPATIENT
Start: 2022-10-14 | End: 2022-10-16

## 2022-10-14 RX ORDER — LANOLIN
1 OINTMENT (GRAM) TOPICAL EVERY 6 HOURS
Refills: 0 | Status: DISCONTINUED | OUTPATIENT
Start: 2022-10-14 | End: 2022-10-16

## 2022-10-14 RX ORDER — MAGNESIUM SULFATE 500 MG/ML
2 VIAL (ML) INJECTION
Qty: 40 | Refills: 0 | Status: DISCONTINUED | OUTPATIENT
Start: 2022-10-14 | End: 2022-10-14

## 2022-10-14 RX ADMIN — Medication 200 GRAM(S): at 19:53

## 2022-10-14 RX ADMIN — Medication 30 MILLIGRAM(S): at 03:55

## 2022-10-14 RX ADMIN — HEPARIN SODIUM 5000 UNIT(S): 5000 INJECTION INTRAVENOUS; SUBCUTANEOUS at 09:39

## 2022-10-14 RX ADMIN — Medication 30 MILLIGRAM(S): at 15:35

## 2022-10-14 RX ADMIN — Medication 975 MILLIGRAM(S): at 13:03

## 2022-10-14 RX ADMIN — Medication 100 MILLIGRAM(S): at 03:35

## 2022-10-14 RX ADMIN — Medication 200 GRAM(S): at 07:34

## 2022-10-14 RX ADMIN — ONDANSETRON 4 MILLIGRAM(S): 8 TABLET, FILM COATED ORAL at 03:33

## 2022-10-14 RX ADMIN — Medication 975 MILLIGRAM(S): at 18:18

## 2022-10-14 RX ADMIN — Medication 975 MILLIGRAM(S): at 19:30

## 2022-10-14 RX ADMIN — Medication 30 MILLIGRAM(S): at 22:30

## 2022-10-14 RX ADMIN — Medication 30 MILLIGRAM(S): at 09:37

## 2022-10-14 RX ADMIN — Medication 30 MILLIGRAM(S): at 21:57

## 2022-10-14 RX ADMIN — Medication 975 MILLIGRAM(S): at 13:45

## 2022-10-14 RX ADMIN — Medication 30 MILLIGRAM(S): at 14:47

## 2022-10-14 RX ADMIN — Medication 300 MILLIGRAM(S): at 00:04

## 2022-10-14 RX ADMIN — HEPARIN SODIUM 5000 UNIT(S): 5000 INJECTION INTRAVENOUS; SUBCUTANEOUS at 21:57

## 2022-10-14 RX ADMIN — Medication 200 GRAM(S): at 14:47

## 2022-10-14 RX ADMIN — Medication 20 MILLIGRAM(S): at 05:29

## 2022-10-14 NOTE — CHART NOTE - NSCHARTNOTEFT_GEN_A_CORE
Pt is a 28yo  s/p pLTCS for Cat 2 tracing, 800/2000/400. Patient also has sPEC/Mg s/p [id number] and getting Procardia 30XL. HELLP wnl and P/C 0.9.    Vital Signs Last 24 Hrs  T(C): 36.8 (14 Oct 2022 02:50), Max: 37.9 (13 Oct 2022 22:34)  T(F): 98.2 (14 Oct 2022 02:50), Max: 100.22 (13 Oct 2022 22:34)  HR: 104 (14 Oct 2022 08:20) (66 - 147)  BP: 134/73 (14 Oct 2022 08:20) (114/58 - 175/85)  BP(mean): 96 (14 Oct 2022 08:20) (96 - 128)  RR: 20 (14 Oct 2022 08:20) (12 - 26)  SpO2: 96% (14 Oct 2022 08:20) (87% - 100%)    LABS:             10.4   23.83 )-----------( 403      ( 14 Oct 2022 03:34 )             33.3     138  |  106  |  5<L>  ----------------------------<  107<H>  3.7   |  20<L>  |  0.56    Ca    8.3<L>      13 Oct 2022 21:13  Mg     4.0     10-14    TPro  5.8<L>  /  Alb  2.7<L>  /  TBili  0.9  /  DBili  x   /  AST  17  /  ALT  9<L>  /  AlkPhos  203<H>  10-13    PT/INR - ( 13 Oct 2022 21:13 )   PT: 11.2 sec;   INR: 0.97 ratio    PTT - ( 13 Oct 2022 21:13 )  PTT:27.5 sec      Pt is overall recovering well postoperatively. Denies HA, CP, SOB, N/V, RUQ pain. Pain well controlled. Bleeding minimal.    Plan:  - Continue postpartum management  - Mg 24h PP  - Continue Procardia 30XL  - Pt cleared to go to PP floor      Oksana Lizama, PGY1

## 2022-10-14 NOTE — OB NEONATOLOGY/PEDIATRICIAN DELIVERY SUMMARY - NSPEDSNEONOTESA_OBGYN_ALL_OB_FT
Peds called to OR for fetal tachycardia. 40.3 wk female born via CS to a 26 y/o  mother.  Maternal history of left uterine fibroids. Prenatal history of Preeclampsia on mag. Maternal labs include Blood Type A+, HIV - , RPR NR , Rubella I , Hep B - , GBS - from , COVID -. SROM at 2315 on 10/12 with clear fluids (ROM hours: 27).  Difficult extraction of baby- Baby emerged vigorous, crying was warmed, dried suctioned and stimulated with APGARS of 8/9 . Resuscitation included: CPAP initiated at 2 MOL due to color, color improved but SPO2 low 90's- Leif ARGUELLES fellow called to assess baby- CPAP continued with FIo2 of 100% for 2 mins - pulse ox increased to 97%- NB stable on room air and CPAP was d/c'd. NB jittery- chem stable. Mom plans to initiate breastfeeding and formula feed, consents Hep B vaccine and declines circ.  Highest maternal temp: 37.8. EOS .27.    Physical Exam:  Gen: Awake and alert  HEENT: anterior fontanel open soft and flat, no cleft lip/palate, ears normal set, no ear pits or tags. nares clinically patent  Resp: no increased work of breathing, good air entry b/l, clear to auscultation bilaterally  Cardio: Normal S1/S2, regular rate and rhythm  Abd: soft, non tender, non distended, + bowel sounds, umbilical cord with 3 vessels  Neuro: +grasp/suck/maria g, normal tone  Extremities: negative mcbride and ortolani, moving all extremities, full range of motion x 4, no crepitus  Skin: pink, warm  Genitals: Normal female anatomy, [place] 1, anus appears patent

## 2022-10-14 NOTE — OB PROVIDER LABOR PROGRESS NOTE - NS_OBIHIFHRDETAILS_OBGYN_ALL_OB_FT
145, moderate variability, no accels, no decels
150m moderate variantly, + accels, occ decels
165 moderate variability +late decels (while on back)   Improved s/p repositionin moderate variability -accels -decels
170, moderate variability, no accels, 1-2 late decels
150/mod/+accels, no decels
165, min/mod variability, no accels (until scalp stim during exam), no decels
Cat 1: 160/mod variability/ + accels/ - decels
175, moderate variability, + accels, no decels

## 2022-10-14 NOTE — OB PROVIDER DELIVERY SUMMARY - NSSELHIDDEN_OBGYN_ALL_OB_FT
[[email or web address]_OBGYN_ALL_OB_FT:[id number]],[NS_DeliveryAssist1_OBGYN_ALL_OB_FT:[id number]],[NS_DeliveryRN_OBGYN_ALL_OB_FT:[id number]]

## 2022-10-14 NOTE — OB RN DELIVERY SUMMARY - NS_LABORCHARACTER_OBGYN_ALL_OB
Induction of labor-Medicinal/External electronic FM/Antibiotics in labor/Meconium staining/Fetal intolerance

## 2022-10-14 NOTE — OB RN DELIVERY SUMMARY - NS_SEPSISRSKCALC_OBGYN_ALL_OB_FT
EOS calculated successfully. EOS Risk Factor: 0.5/1000 live births ([facility] national incidence); GA=40w5d; Temp=100.22; ROM=26.283; GBS='Negative'; Antibiotics='GBS specific antibiotics > 2 hrs prior to birth'

## 2022-10-14 NOTE — OB PROVIDER LABOR PROGRESS NOTE - NS_SUBJECTIVE/OBJECTIVE_OBGYN_ALL_OB_FT
Pt desire to continue w trial of labor  Shortly after that noted  couple decelerations    no maternal complaints    ICU Vital Signs Last 24 Hrs  T(C): 37.8 (13 Oct 2022 19:33), Max: 37.8 (13 Oct 2022 19:33)  HR: 118 (13 Oct 2022 21:29) (63 - 147)  BP: 149/84 (13 Oct 2022 21:29) (114/58 - 171/81)  RR: 14 (13 Oct 2022 14:44) (14 - 19)  SpO2: 99% (13 Oct 2022 21:26) (87% - 100%)                          10.6   9.10  )-----------( 401      ( 12 Oct 2022 18:50 )             34.1   repeat  pending
pt stable    ICU Vital Signs Last 24 Hrs  T(C): 37.9 (13 Oct 2022 22:34), Max: 37.9 (13 Oct 2022 22:34)  HR: 121 (14 Oct 2022 00:21) (63 - 147)  BP: 131/79 (14 Oct 2022 00:07) (114/58 - 175/85)  RR: 18 (13 Oct 2022 21:40) (14 - 19)  SpO2: 98% (14 Oct 2022 00:21) (87% - 100%)
Patient reports the urge to poop. She does not want an epidural at this time.    T(C): 36.9 (10-13-22 @ 01:01), Max: 36.9 (10-12-22 @ 22:50)  HR: 86 (10-13-22 @ 02:07) (63 - 115)  BP: 135/68 (10-13-22 @ 02:07) (113/68 - 148/80)  RR: 18 (10-13-22 @ 01:01) (18 - 18)  SpO2: 100% (10-13-22 @ 02:03) (90% - 100%)
Patient seen and evaluated at bedside, laying on back. Endorsing increased pressure.
painful contractions  no HA, CP, SOB or palitations
no current complaints  note fetal tachy w rising maternal temp  no HA, CP, SOB or abd pain  + emisis improved w Zofran then severe range BP noted    ICU Vital Signs Last 24 Hrs  T(C): 37.8 (13 Oct 2022 19:33), Max: 37.8 (13 Oct 2022 19:33)  HR: 105 (13 Oct 2022 20:51) (63 - 147)  BP: 144/83 (13 Oct 2022 20:44) (114/58 - 171/81)  RR: 14 (13 Oct 2022 14:44) (14 - 19)  SpO2: 98% (13 Oct 2022 20:51) (87% - 100%)
Pt seen and examined at bedside after expulsion of CB.    Vital Signs Last 24 Hrs  T(C): 36.9 (13 Oct 2022 13:35), Max: 37.0 (13 Oct 2022 05:01)  T(F): 98.42 (13 Oct 2022 13:35), Max: 98.6 (13 Oct 2022 05:01)  HR: 92 (13 Oct 2022 13:54) (63 - 115)  BP: 124/72 (13 Oct 2022 13:54) (113/68 - 171/81)  BP(mean): --  RR: 19 (13 Oct 2022 09:58) (18 - 19)  SpO2: 98% (13 Oct 2022 13:54) (90% - 100%)    Parameters below as of 12 Oct 2022 19:05  Patient On (Oxygen Delivery Method): room air
for reeval for labor dispo    ICU Vital Signs Last 24 Hrs  T(C): 37.9 (13 Oct 2022 22:34), Max: 37.9 (13 Oct 2022 22:34)  HR: 122 (13 Oct 2022 22:36) (63 - 147)  BP: 133/68 (13 Oct 2022 22:27) (114/58 - 175/85)  RR: 18 (13 Oct 2022 21:40) (14 - 19)  SpO2: 98% (13 Oct 2022 22:36) (87% - 100%)                          10.6   9.10  )-----------( 401      ( 12 Oct 2022 18:50 )             34.1     10-13    138  |  106  |  5<L>  ----------------------------<  107<H>  3.7   |  20<L>  |  0.56    Ca    8.3<L>      13 Oct 2022 21:13    TPro  5.8<L>  /  Alb  2.7<L>  /  TBili  0.9  /  DBili  x   /  AST  17  /  ALT  9<L>  /  AlkPhos  203<H>  10-13
R1 OB Labor Note    S: Patient seen and examined at bedside.     T(C): 36.8 (10-13-22 @ 11:00), Max: 37.0 (10-13-22 @ 05:01)  HR: 81 (10-13-22 @ 11:39) (63 - 115)  BP: 133/65 (10-13-22 @ 11:37) (113/68 - 171/81)  RR: 19 (10-13-22 @ 09:58) (18 - 19)  SpO2: 100% (10-13-22 @ 11:39) (90% - 100%)

## 2022-10-14 NOTE — OB RN DELIVERY SUMMARY - NSSELHIDDEN_OBGYN_ALL_OB_FT
[NS_DeliveryAttending1_OBGYN_ALL_OB_FT:[id number]],[NS_DeliveryAssist1_OBGYN_ALL_OB_FT:[id number]],[NS_DeliveryRN_OBGYN_ALL_OB_FT:[id number]]

## 2022-10-14 NOTE — PROGRESS NOTE ADULT - SUBJECTIVE AND OBJECTIVE BOX
Day 1 of Anesthesia Pain Management Service    SUBJECTIVE: Doing ok  Pain Scale Score:          [X] Refer to charted pain scores    THERAPY:  s/p   2  mg PF epidural morphine on 10\14\2022       MEDICATIONS  (STANDING):  ampicillin  IVPB      ampicillin  IVPB 2 Gram(s) IV Intermittent every 6 hours  chlorhexidine 2% Cloths 1 Application(s) Topical once  citric acid/sodium citrate Solution 15 milliLiter(s) Oral every 6 hours  dextrose 5% + lactated ringers. 1000 milliLiter(s) (125 mL/Hr) IV Continuous <Continuous>  heparin   Injectable 5000 Unit(s) SubCutaneous every 12 hours  influenza   Vaccine 0.5 milliLiter(s) IntraMuscular once  lactated ringers Bolus 1000 milliLiter(s) IV Bolus once  lactated ringers. 1000 milliLiter(s) (125 mL/Hr) IV Continuous <Continuous>  magnesium sulfate  IVPB 4 Gram(s) IV Intermittent once  magnesium sulfate Infusion 2 Gm/Hr (50 mL/Hr) IV Continuous <Continuous>  magnesium sulfate Infusion 2 Gm/Hr (50 mL/Hr) IV Continuous <Continuous>  magnesium sulfate Infusion 2 Gm/Hr (50 mL/Hr) IV Continuous <Continuous>  metoclopramide Injectable 5 milliGRAM(s) IV Push once  misoprostol Oral Solution 60 MICROGram(s) Oral every 2 hours  misoprostol Oral Solution 40 MICROGram(s) Oral every 2 hours  morphine PF Epidural 2 milliGRAM(s) Epidural once  NIFEdipine XL 30 milliGRAM(s) Oral daily  oxytocin Infusion 333.333 milliUNIT(s)/Min (1000 mL/Hr) IV Continuous <Continuous>  oxytocin Infusion.  milliUNIT(s)/Min (2 mL/Hr) IV Continuous <Continuous>    MEDICATIONS  (PRN):  dexAMETHasone  Injectable 4 milliGRAM(s) IV Push every 6 hours PRN Nausea  nalbuphine Injectable 2.5 milliGRAM(s) IV Push every 6 hours PRN Pruritus  naloxone Injectable 0.1 milliGRAM(s) IV Push every 3 minutes PRN For ANY of the following changes in patient status:  A. Breaths Per Minute LESS THAN 10, B. Oxygen saturation LESS THAN 90%, C. Sedation score of 6 for Stop After: 4 Times  ondansetron Injectable 4 milliGRAM(s) IV Push every 6 hours PRN Nausea  oxyCODONE    IR 5 milliGRAM(s) Oral every 3 hours PRN Mild Pain (1 - 3)      OBJECTIVE:    Sedation:        	[X] Alert	 [ ] Drowsy	[ ] Arousable      [ ] Asleep       [ ] Unresponsive    Side Effects:	[ ] None 	[X ] Nausea	[ ] Vomiting         [ ] Pruritus  		[ ] Weakness            [ ] Numbness	          [ ] Other:    Vital Signs Last 24 Hrs  T(C): 36.8 (14 Oct 2022 02:50), Max: 37.9 (13 Oct 2022 22:34)  T(F): 98.2 (14 Oct 2022 02:50), Max: 100.22 (13 Oct 2022 22:34)  HR: 104 (14 Oct 2022 08:20) (66 - 147)  BP: 134/73 (14 Oct 2022 08:20) (114/58 - 175/85)  BP(mean): 96 (14 Oct 2022 08:20) (96 - 128)  RR: 20 (14 Oct 2022 08:20) (12 - 26)  SpO2: 96% (14 Oct 2022 08:20) (87% - 100%)        ASSESSMENT/ PLAN  [X] Patient to be transitioned to prn analgesics after 24 hours  [X] Pain management per primary service, pain service to sign off   [X]Documentation and Verification of current medications

## 2022-10-14 NOTE — OB PROVIDER DELIVERY SUMMARY - NSPROVIDERDELIVERYNOTE_OBGYN_ALL_OB_FT
primary LTCS for Cat 2 tracing, uncomplicated  viable female infant, vertex presentation, Apgars 8/9, cord gasses sent  Grossly normal fallopian tubes, uterus, and ovaries       Upon entry into the peritoneal cavity there was serous fluid noted   Due to tissue dystocia bandage scissors were used to extend the hysterotomy  Uterus closed in 2 layers with vicryl   Muscle closed with chromic    EBL: 800  IVF: 2000  UOP:400     Restart maintenance Magnesium   Clinda 900mg  to be given     Yadira PGY3  w/  primary LTCS for Cat 2 tracing, uncomplicated  viable female infant, vertex presentation, Apgars 8/9, cord gasses sent  Grossly normal fallopian tubes, uterus, and ovaries       Upon entry into the peritoneal cavity there was serous fluid noted   Due to tissue dystocia bandage scissors were used to extend the hysterotomy  Uterus closed in 2 layers with vicryl   Muscle closed with chromic    EBL: 800  IVF: 2000  UOP:400       Restart maintenance Magnesium   Clinda 900mg  to be given   Dictation #: [id number]  Yadira PGY3  w/  primary LTCS for Cat 2 tracing, uncomplicated  viable female infant, vertex presentation, Apgars 8/9, cord gasses sent  Grossly normal fallopian tubes, uterus, and ovaries       Upon entry into the peritoneal cavity there was copious serous fluid noted   Due to tissue dystocia bandage scissors were used to extend the hysterotomy  Uterus closed in 2 layers with vicryl   Muscle closed with chromic    EBL: 800  IVF: 2000  UOP:400       Restart maintenance Magnesium   Clinda 900mg  to be given   Dictation #: [id number]  Yadira PGY3  w/

## 2022-10-14 NOTE — OB PROVIDER LABOR PROGRESS NOTE - ASSESSMENT
Back Note    primp induction @ 40+ weeks for new onset proteinuria the progressed to preeclampsia  plan pain control--for epidural, discussed w anesthesia  left lateral     Expectant management  reviewed FHR and out plan of care      KEERTHI Martin MD  attending
Primip induction for now PEC w sF for systolic > 160  magnesium started   Procardia 30 XL--though cont consistently severe range she does have  elevated BP.  (as discussed previously w pt, as prior elevations w movement or other actions, would wait for additional severe range to start magnesium  --as another severe now,  will start, will see if IVP antihyertensive indicated consistent severe range BP)    Rising temp though not meeting criteria of maternal temp  note fetal tachycardia likely first sign of pending temp  as pt > 18 hours  ROM will start abx for prolonged  ROM and risk    Spoke w pt's mother by phone on  speaker  discussed clinical picture and likely pending fever and prolonged ROM as well as poss C/S    Spoke w pt and partner  discussed indication for magnesium  discussed pending temp and start of abx  pt making cervical change  Pt states tired  Discussed C/S delivery at remove from delivery in setting or PEC and likely maternal temp and fetal tachy    Pt and partner discussing--will proceed w abx, magnesium  s/p 500ml IV bolus  until pt and partner make decision    KEERTHI Martin MD  attending  
Primip w PEC remote from delivery    --as FHR baseline trending toward nml and and now new decels, started discussion on continuing labor trial and pt states desire to stop induction and proceed w C/S. She is concerned re her health and fetal tachycardia and greater than 24 hours of induction w little knowledge of  who long to fully and delivery    in light of pt desire, L&D team made aware  pitocin stopped  continue magnesium  s/p ampicillin  pending gentamycin (awaiting  from  pharmacy)    planned EKG for maternal tachy, will do if continues after delivery as anticipate OR shortly  If resolved after delivery will cancel  plan reviewed w the team    KEERTHI Martin MD  attending
Pt is a 27y  at 40w4d admitted for eIOL, c/b PEC.    -Labor: pt making cervical change, s/p CB/PO/BC, for pitocin 2x2  -Fetus: cat 1, reassuring  -GBS: negative  -Pain: epidural in place, effective    D/w Dr. Mario Salas PGY1
magali note    Primp induction for new onset proteinuria @ term and now w PEC w sF, systolic > 160.   magnesium started  Procardia 30 Xl started  as mostly elevated but intermittent severe range  (as discussed prior to start of magnesium, if another severe range BP will start mag. Prior severe range was around pt moving or pain and other wise elevated or nml BPs    maternal tachycardia  fetal tachycardia  reviewed w pt and partner  discussed risking temp in setting of tachy w likely pending fever  abx started for prolonged ROM, as has not met criteria for chorio  (note repeat temp wnl though pt was eating ice chips shortly before, advised to repeat later)    Recommended C/S, shortly after  discussion pt decided to continue w trial of labor for few FHR deceleration in clinical setting of PEC, fetal tachycardia and likely pending temp remote from delivery  reviewed considered indication--pt and partner request more time for FHR to respond to  treatment  discussed reeval  ~ 10 PM w understanding to cut short if emergent change in status.     KEERTHI Martin MD  attending
primp induction w PEC and fetal tachycardia  FHR no longer tachy after start of abx    pt desire C/S delivery as > 24 hours  waiting availability of OR and staff    KEERTHI Martin MD  attending
A/P:   - Labor:  at 40w4d IOL for PEC, s/p BC now on PO. Cervical balloon placed. Continue PO. Pt had two severe BPs while the cervical balloon was being placed, so the patient is not yet considered sPEC.  - Fetus: Cat 1  - GBS: Negative  - Pain: Pt has epidural, still uncomfortable    Oksana Lizama, PGY-1  d/w Dr. Martin
26yo  @40+4 eIOL    - SVE: 0.5/50/-3  - Abilio too often for buccal, will switch to po  - FHT Cat 1, reassuring, ctm    d/w Dr. Olegario Bateman, PGY2
27y  at 40w4d admitted for rrIOL, who has since met criteria for PEC without severe features:    - Labor: Continue PO Cytotec as tolerated for IOL. Now s/p SROM at 11p    - Category II tracing, overall reassuring and improved s/p repositioning. 1L IVF bolus to be administered.    - PEC: HELLP wnl, BPs appropriate range    - GBS: Negative    - Analgesia: Currently comfortable     dw Dr Olegario Bee MD, PGY4

## 2022-10-15 LAB
ALBUMIN SERPL ELPH-MCNC: 2.3 G/DL — LOW (ref 3.3–5)
ALP SERPL-CCNC: 158 U/L — HIGH (ref 40–120)
ALT FLD-CCNC: 7 U/L — LOW (ref 10–45)
ANION GAP SERPL CALC-SCNC: 12 MMOL/L — SIGNIFICANT CHANGE UP (ref 5–17)
APTT BLD: 29.6 SEC — SIGNIFICANT CHANGE UP (ref 27.5–35.5)
AST SERPL-CCNC: 18 U/L — SIGNIFICANT CHANGE UP (ref 10–40)
BILIRUB SERPL-MCNC: 0.5 MG/DL — SIGNIFICANT CHANGE UP (ref 0.2–1.2)
BUN SERPL-MCNC: 6 MG/DL — LOW (ref 7–23)
CALCIUM SERPL-MCNC: 7.7 MG/DL — LOW (ref 8.4–10.5)
CHLORIDE SERPL-SCNC: 107 MMOL/L — SIGNIFICANT CHANGE UP (ref 96–108)
CO2 SERPL-SCNC: 22 MMOL/L — SIGNIFICANT CHANGE UP (ref 22–31)
CREAT SERPL-MCNC: 0.61 MG/DL — SIGNIFICANT CHANGE UP (ref 0.5–1.3)
EGFR: 124 ML/MIN/1.73M2 — SIGNIFICANT CHANGE UP
FIBRINOGEN PPP-MCNC: 1072 MG/DL — HIGH (ref 330–520)
GLUCOSE BLDC GLUCOMTR-MCNC: 113 MG/DL — HIGH (ref 70–99)
GLUCOSE SERPL-MCNC: 48 MG/DL — CRITICAL LOW (ref 70–99)
HCT VFR BLD CALC: 29 % — LOW (ref 34.5–45)
HGB BLD-MCNC: 8.9 G/DL — LOW (ref 11.5–15.5)
INR BLD: 1.02 RATIO — SIGNIFICANT CHANGE UP (ref 0.88–1.16)
LDH SERPL L TO P-CCNC: 290 U/L — HIGH (ref 50–242)
MCHC RBC-ENTMCNC: 23.7 PG — LOW (ref 27–34)
MCHC RBC-ENTMCNC: 30.7 GM/DL — LOW (ref 32–36)
MCV RBC AUTO: 77.1 FL — LOW (ref 80–100)
NRBC # BLD: 0 /100 WBCS — SIGNIFICANT CHANGE UP (ref 0–0)
PLATELET # BLD AUTO: 468 K/UL — HIGH (ref 150–400)
POTASSIUM SERPL-MCNC: 3.9 MMOL/L — SIGNIFICANT CHANGE UP (ref 3.5–5.3)
POTASSIUM SERPL-SCNC: 3.9 MMOL/L — SIGNIFICANT CHANGE UP (ref 3.5–5.3)
PROT SERPL-MCNC: 5.4 G/DL — LOW (ref 6–8.3)
PROTHROM AB SERPL-ACNC: 11.8 SEC — SIGNIFICANT CHANGE UP (ref 10.5–13.4)
RBC # BLD: 3.76 M/UL — LOW (ref 3.8–5.2)
RBC # FLD: 17.4 % — HIGH (ref 10.3–14.5)
SODIUM SERPL-SCNC: 141 MMOL/L — SIGNIFICANT CHANGE UP (ref 135–145)
URATE SERPL-MCNC: 6.4 MG/DL — SIGNIFICANT CHANGE UP (ref 2.5–7)
WBC # BLD: 20.08 K/UL — HIGH (ref 3.8–10.5)
WBC # FLD AUTO: 20.08 K/UL — HIGH (ref 3.8–10.5)

## 2022-10-15 PROCEDURE — 93010 ELECTROCARDIOGRAM REPORT: CPT

## 2022-10-15 RX ORDER — IBUPROFEN 200 MG
600 TABLET ORAL EVERY 6 HOURS
Refills: 0 | Status: DISCONTINUED | OUTPATIENT
Start: 2022-10-15 | End: 2022-10-16

## 2022-10-15 RX ORDER — OXYCODONE HYDROCHLORIDE 5 MG/1
5 TABLET ORAL
Refills: 0 | Status: DISCONTINUED | OUTPATIENT
Start: 2022-10-15 | End: 2022-10-16

## 2022-10-15 RX ADMIN — OXYCODONE HYDROCHLORIDE 5 MILLIGRAM(S): 5 TABLET ORAL at 22:00

## 2022-10-15 RX ADMIN — Medication 200 GRAM(S): at 08:41

## 2022-10-15 RX ADMIN — Medication 975 MILLIGRAM(S): at 23:50

## 2022-10-15 RX ADMIN — Medication 600 MILLIGRAM(S): at 20:09

## 2022-10-15 RX ADMIN — Medication 975 MILLIGRAM(S): at 12:37

## 2022-10-15 RX ADMIN — Medication 975 MILLIGRAM(S): at 00:21

## 2022-10-15 RX ADMIN — Medication 975 MILLIGRAM(S): at 01:46

## 2022-10-15 RX ADMIN — OXYCODONE HYDROCHLORIDE 5 MILLIGRAM(S): 5 TABLET ORAL at 20:09

## 2022-10-15 RX ADMIN — Medication 600 MILLIGRAM(S): at 08:41

## 2022-10-15 RX ADMIN — Medication 975 MILLIGRAM(S): at 06:32

## 2022-10-15 RX ADMIN — Medication 975 MILLIGRAM(S): at 12:07

## 2022-10-15 RX ADMIN — Medication 30 MILLIGRAM(S): at 03:16

## 2022-10-15 RX ADMIN — Medication 200 GRAM(S): at 01:38

## 2022-10-15 RX ADMIN — Medication 200 GRAM(S): at 14:35

## 2022-10-15 RX ADMIN — Medication 600 MILLIGRAM(S): at 14:35

## 2022-10-15 RX ADMIN — Medication 975 MILLIGRAM(S): at 17:37

## 2022-10-15 RX ADMIN — Medication 600 MILLIGRAM(S): at 21:00

## 2022-10-15 RX ADMIN — Medication 30 MILLIGRAM(S): at 02:44

## 2022-10-15 RX ADMIN — HEPARIN SODIUM 5000 UNIT(S): 5000 INJECTION INTRAVENOUS; SUBCUTANEOUS at 08:40

## 2022-10-15 RX ADMIN — HEPARIN SODIUM 5000 UNIT(S): 5000 INJECTION INTRAVENOUS; SUBCUTANEOUS at 20:10

## 2022-10-15 RX ADMIN — Medication 600 MILLIGRAM(S): at 09:11

## 2022-10-15 RX ADMIN — Medication 600 MILLIGRAM(S): at 15:05

## 2022-10-15 RX ADMIN — Medication 975 MILLIGRAM(S): at 18:07

## 2022-10-15 RX ADMIN — Medication 975 MILLIGRAM(S): at 05:44

## 2022-10-15 NOTE — CONSULT NOTE ADULT - ATTENDING COMMENTS
If tachycardia persists in a few days, would arrange for venous duplex and echo (We will call her tomorrow when she is home)    Debbie [phone]

## 2022-10-15 NOTE — PROGRESS NOTE ADULT - ASSESSMENT
A/P: 26yo POD#1 s/p LTCS.  Patient is stable and doing well post-operatively.  Pregnancy complicated by severe preeclampsia      #sPEC   -s/p Magnesium   -Continue to monitor blood pressures  -AM HELLP labs   -Monitor blood pressures   -Email Cardio OB     #Post op  - Continue regular diet.  - OOB, Increase ambulation.  - Continue motrin, tylenol, oxycodone PRN for pain control      Sasha May, PGY3

## 2022-10-15 NOTE — CONSULT NOTE ADULT - SUBJECTIVE AND OBJECTIVE BOX
CARDIOLOGY FELLOW CONSULT NOTE      HPI: The patient is a 27y female with no significant PMH who is POD#1 from a  at 40 weeks secondary to pre-eclampsia. The patient is currently on Nifedipine 30mg daily. She reports an otherwise normal pregnancy, does mention having intermittent palpitations during her pregnancy but attributes this to stress at work. Denies any chest pain, dyspnea, palpitations, light-headedness currently. States she began noticing swelling of her Lower extremities towards the end of her pregnancy. Cardiology was consulted due to persistent tachycardia since her delivery.     PMH: None     PSHx:   No significant past surgical history        Allergies:  No Known Allergies      Home Meds:    Current Medications:   acetaminophen     Tablet .. 975 milliGRAM(s) Oral <User Schedule>  ampicillin  IVPB      ampicillin  IVPB 2 Gram(s) IV Intermittent every 6 hours  heparin   Injectable 5000 Unit(s) SubCutaneous every 12 hours  ibuprofen  Tablet. 600 milliGRAM(s) Oral every 6 hours  influenza   Vaccine 0.5 milliLiter(s) IntraMuscular once  lactated ringers Bolus 1000 milliLiter(s) IV Bolus once  lanolin Ointment 1 Application(s) Topical every 6 hours PRN  magnesium hydroxide Suspension 30 milliLiter(s) Oral two times a day PRN  magnesium sulfate Infusion 2 Gm/Hr IV Continuous <Continuous>  metoclopramide Injectable 5 milliGRAM(s) IV Push once  misoprostol Oral Solution 40 MICROGram(s) Oral every 2 hours  NIFEdipine XL 30 milliGRAM(s) Oral daily  oxyCODONE    IR 5 milliGRAM(s) Oral every 3 hours PRN  oxytocin Infusion 333.333 milliUNIT(s)/Min IV Continuous <Continuous>  simethicone 80 milliGRAM(s) Chew every 4 hours PRN      REVIEW OF SYSTEMS:  CONSTITUTIONAL: No weakness, fevers or chills  EYES/ENT: No visual changes;  No dysphagia  NECK: No pain or stiffness  RESPIRATORY: No cough, wheezing, hemoptysis; No shortness of breath  CARDIOVASCULAR: No chest pain or palpitations; + lower extremity edema  GASTROINTESTINAL: No abdominal or epigastric pain. No nausea, vomiting, or hematemesis; No diarrhea or constipation. No melena or hematochezia.  BACK: No back pain  GENITOURINARY: No dysuria, frequency or hematuria  NEUROLOGICAL: No numbness or weakness  SKIN: No itching, burning, rashes, or lesions   All other review of systems is negative unless indicated above.    Physical Exam:  T(F): 98 (10-15), Max: 98.3 (10-15)  HR: 115 (10-15) (82 - 115)  BP: 113/76 (10-15) (105/70 - 135/88)  RR: 18 (10-15)  SpO2: 97% (10-15)  GENERAL: No acute distress, well-developed  HEAD:  Atraumatic, Normocephalic  ENT: conjunctiva and sclera clear, Neck supple, No JVD, moist mucosa  CHEST/LUNG: Clear to auscultation bilaterally; No wheeze, equal breath sounds bilaterally   HEART: Regular rate and rhythm; No murmurs, rubs, or gallops  ABDOMEN: Soft, Nontender, Nondistended  EXTREMITIES:  No clubbing, cyanosis, 1+ LE edema   PSYCH: Nl behavior, nl affect  NEUROLOGY: AAOx3  SKIN: Normal color, No rashes or lesions  LINES:               8.9    20.08 )-----------( 468      ( 15 Oct 2022 07:39 )             29.0     10-15    141  |  107  |  6<L>  ----------------------------<  48<LL>  3.9   |  22  |  0.61    Ca    7.7<L>      15 Oct 2022 07:35  Mg     5.7     10-14    TPro  5.4<L>  /  Alb  2.3<L>  /  TBili  0.5  /  DBili  x   /  AST  18  /  ALT  7<L>  /  AlkPhos  158<H>  10-15    PT/INR - ( 15 Oct 2022 07:39 )   PT: 11.8 sec;   INR: 1.02 ratio         PTT - ( 15 Oct 2022 07:39 )  PTT:29.6 sec

## 2022-10-15 NOTE — CONSULT NOTE ADULT - ASSESSMENT
The patient is a 27y female with no significant PMH who is POD#1 from a  at 40 weeks secondary to pre-eclampsia.  Cardiology was consulted due to persistent tachycardia since her delivery.     Recommendations:   - EKG 10/15 with HR of 94, sinus rhythm with sinus arrhythmia; normal EKG, no acute ischemic changes   - Pt not tachycardic on exam, upper limits of normal HR   - Please evaluate for any reversible causes of likely sinus tachycardia: Anemia (pt has had a drop of Hgb from 11 on admission to 8.9), dehydration, electrolyte abnormalities, infection  - Tachycardia likely in the setting of recent stressors with pre-eclampsia and birth   - BP well controlled, continue Nifedipine   - No further cardiac intervention needed at this time       Kim Bernardo MD  Cardiology Fellow     Recommendations are preliminary until cosigned by attending

## 2022-10-15 NOTE — PROGRESS NOTE ADULT - SUBJECTIVE AND OBJECTIVE BOX
OB Progress Note:  Delivery, POD#1    S: 28yo POD#1 s/p pLTCS for cat 2 tracing. Pregnancy complicated by severe preeclampsia . Her pain is well controlled. She is tolerating a regular diet and passing flatus. Denies N/V. Denies CP/SOB/lightheadedness/dizziness. Endorses light vaginal bleeding, less than one pad per hour. She is ambulating without difficulty. Voiding spontaneously.     O:   Vital Signs Last 24 Hrs  T(C): 36.4 (15 Oct 2022 00:33), Max: 37 (14 Oct 2022 16:30)  T(F): 97.5 (15 Oct 2022 00:33), Max: 98.6 (14 Oct 2022 16:30)  HR: 107 (15 Oct 2022 00:33) (82 - 117)  BP: 126/79 (15 Oct 2022 00:33) (115/73 - 161/89)  BP(mean): 93 (14 Oct 2022 08:50) (93 - 119)  RR: 17 (15 Oct 2022 00:33) (12 - 26)  SpO2: 100% (15 Oct 2022 00:33) (93% - 100%)    Parameters below as of 15 Oct 2022 00:33  Patient On (Oxygen Delivery Method): room air        Labs:  Blood type: A Positive  Rubella IgG: RPR: Negative                          9.3<L>   25.91<H> >-----------< 382    ( 10-14 @ 16:43 )             29.5<L>                        10.4<L>   23.83<H> >-----------< 403<H>    ( 10-14 @ 03:34 )             33.3<L>                        10.6<L>   9.10 >-----------< 401<H>    ( 10-12 @ 18:50 )             34.1<L>    10-13-22 @ 21:13      138  |  106  |  5<L>  ----------------------------<  107<H>  3.7   |  20<L>  |  0.56    10-12-22 @ 18:50      137  |  104  |  6<L>  ----------------------------<  102<H>  4.0   |  19<L>  |  0.47<L>        Ca    8.3<L>      13 Oct 2022 21:13  Ca    9.0      12 Oct 2022 18:50  Mg     5.7<H>     10-14  Mg     5.7<H>     10-14  Mg     5.4<H>     10-14  Mg     4.0<H>     10-14    TPro  5.8<L>  /  Alb  2.7<L>  /  TBili  0.9  /  DBili  x   /  AST  17  /  ALT  9<L>  /  AlkPhos  203<H>  10-13-22 @ 21:13  TPro  6.5  /  Alb  3.1<L>  /  TBili  0.6  /  DBili  x   /  AST  13  /  ALT  10  /  AlkPhos  211<H>  10-12-22 @ 18:50          PE:  General: NAD  Heart: extremities well-perfused  Lungs: breathing comfortably  Abdomen: Mildly distended, appropriately tender, firm fundus, incision c/d/i  Extremities: No erythema, no pitting edema

## 2022-10-16 ENCOUNTER — TRANSCRIPTION ENCOUNTER (OUTPATIENT)
Age: 27
End: 2022-10-16

## 2022-10-16 VITALS
HEART RATE: 104 BPM | SYSTOLIC BLOOD PRESSURE: 104 MMHG | RESPIRATION RATE: 18 BRPM | OXYGEN SATURATION: 100 % | TEMPERATURE: 99 F | DIASTOLIC BLOOD PRESSURE: 73 MMHG

## 2022-10-16 PROCEDURE — 85730 THROMBOPLASTIN TIME PARTIAL: CPT

## 2022-10-16 PROCEDURE — 85384 FIBRINOGEN ACTIVITY: CPT

## 2022-10-16 PROCEDURE — 82962 GLUCOSE BLOOD TEST: CPT

## 2022-10-16 PROCEDURE — 84550 ASSAY OF BLOOD/URIC ACID: CPT

## 2022-10-16 PROCEDURE — 83615 LACTATE (LD) (LDH) ENZYME: CPT

## 2022-10-16 PROCEDURE — 86850 RBC ANTIBODY SCREEN: CPT

## 2022-10-16 PROCEDURE — G0463: CPT

## 2022-10-16 PROCEDURE — 59050 FETAL MONITOR W/REPORT: CPT

## 2022-10-16 PROCEDURE — 85610 PROTHROMBIN TIME: CPT

## 2022-10-16 PROCEDURE — 82570 ASSAY OF URINE CREATININE: CPT

## 2022-10-16 PROCEDURE — 88307 TISSUE EXAM BY PATHOLOGIST: CPT

## 2022-10-16 PROCEDURE — 85025 COMPLETE CBC W/AUTO DIFF WBC: CPT

## 2022-10-16 PROCEDURE — 86780 TREPONEMA PALLIDUM: CPT

## 2022-10-16 PROCEDURE — 84156 ASSAY OF PROTEIN URINE: CPT

## 2022-10-16 PROCEDURE — 81001 URINALYSIS AUTO W/SCOPE: CPT

## 2022-10-16 PROCEDURE — 59025 FETAL NON-STRESS TEST: CPT

## 2022-10-16 PROCEDURE — 36415 COLL VENOUS BLD VENIPUNCTURE: CPT

## 2022-10-16 PROCEDURE — 86900 BLOOD TYPING SEROLOGIC ABO: CPT

## 2022-10-16 PROCEDURE — 87635 SARS-COV-2 COVID-19 AMP PRB: CPT

## 2022-10-16 PROCEDURE — 99222 1ST HOSP IP/OBS MODERATE 55: CPT

## 2022-10-16 PROCEDURE — 86901 BLOOD TYPING SEROLOGIC RH(D): CPT

## 2022-10-16 PROCEDURE — 86769 SARS-COV-2 COVID-19 ANTIBODY: CPT

## 2022-10-16 PROCEDURE — 80053 COMPREHEN METABOLIC PANEL: CPT

## 2022-10-16 PROCEDURE — 83735 ASSAY OF MAGNESIUM: CPT

## 2022-10-16 RX ORDER — NIFEDIPINE 30 MG
1 TABLET, EXTENDED RELEASE 24 HR ORAL
Qty: 0 | Refills: 0 | DISCHARGE
Start: 2022-10-16

## 2022-10-16 RX ORDER — OXYCODONE HYDROCHLORIDE 5 MG/1
1 TABLET ORAL
Qty: 0 | Refills: 0 | DISCHARGE
Start: 2022-10-16

## 2022-10-16 RX ADMIN — Medication 975 MILLIGRAM(S): at 05:29

## 2022-10-16 RX ADMIN — Medication 600 MILLIGRAM(S): at 02:57

## 2022-10-16 RX ADMIN — Medication 600 MILLIGRAM(S): at 08:37

## 2022-10-16 RX ADMIN — Medication 30 MILLIGRAM(S): at 02:58

## 2022-10-16 RX ADMIN — Medication 975 MILLIGRAM(S): at 01:00

## 2022-10-16 RX ADMIN — HEPARIN SODIUM 5000 UNIT(S): 5000 INJECTION INTRAVENOUS; SUBCUTANEOUS at 08:37

## 2022-10-16 RX ADMIN — Medication 600 MILLIGRAM(S): at 14:56

## 2022-10-16 RX ADMIN — Medication 975 MILLIGRAM(S): at 12:11

## 2022-10-16 RX ADMIN — Medication 975 MILLIGRAM(S): at 05:46

## 2022-10-16 RX ADMIN — Medication 600 MILLIGRAM(S): at 00:40

## 2022-10-16 NOTE — DISCHARGE NOTE OB - MEDICATION SUMMARY - MEDICATIONS TO TAKE
I will START or STAY ON the medications listed below when I get home from the hospital:    oxyCODONE 5 mg oral tablet  -- 1 tab(s) by mouth every 3 to 4 hours, As Needed  -- Indication: For Severe pain    NIFEdipine 30 mg oral tablet, extended release  -- 1 tab(s) by mouth once a day  -- Indication: For hypertension

## 2022-10-16 NOTE — DISCHARGE NOTE OB - CARE PROVIDER_API CALL
Mila Martin)  OBSN  General  [address] [address]  Phone: [phone]  Fax: [phone]  Follow Up Time:

## 2022-10-16 NOTE — DISCHARGE NOTE OB - PATIENT PORTAL LINK FT
You can access the FollowMyHealth Patient Portal offered by [facility] by registering at the following website: [email or web address]. By joining [facility]’s FollowMyHealth portal, you will also be able to view your health information using other applications (apps) compatible with our system.

## 2022-10-16 NOTE — PROGRESS NOTE ADULT - SUBJECTIVE AND OBJECTIVE BOX
JAMAR FLORES [id number]    R3 Antepartum Progress Note    POD#2   HD#5    Patient seen and examined at bedside.  No acute events overnight. No acute complaints.  Pain well controlled.  Patient is ambulating and tolerating a regular diet.  Denies headache, shortness of breath, RUQ pain, epigastric pain.    Vital Signs Last 24 Hours  T(C): 36.8 (10-16-22 @ 00:32), Max: 36.9 (10-15-22 @ 17:30)  HR: 107 (10-16-22 @ 00:32) (82 - 115)  BP: 114/70 (10-16-22 @ 00:32) (105/70 - 123/81)  RR: 18 (10-16-22 @ 00:32) (17 - 18)  SpO2: 98% (10-16-22 @ 00:32) (97% - 100%)    I&O's Summary    14 Oct 2022 07:01  -  15 Oct 2022 07:00  --------------------------------------------------------  IN: 1425 mL / OUT: 8300 mL / NET: -6875 mL        Physical Exam:  General: NAD  CV: RR, S1, S2, no M/R/G  Lungs: CTA b/l  Abdomen: Soft, non tender  Ext: No pain or swelling     Labs:                        8.9    20.08 )-----------( 468      ( 15 Oct 2022 07:39 )             29.0   baso x      eos x      imm gran x      lymph x      mono x      poly x                            9.3    25.91 )-----------( 382      ( 14 Oct 2022 16:43 )             29.5   baso 0.0    eos 0.0    imm gran x      lymph 3.4    mono 7.8    poly 88.8                         10.4   23.83 )-----------( 403      ( 14 Oct 2022 03:34 )             33.3   baso 0.0    eos 0.0    imm gran x      lymph 3.5    mono 1.7    poly 94.8       MEDICATIONS  (STANDING):  acetaminophen     Tablet .. 975 milliGRAM(s) Oral <User Schedule>  heparin   Injectable 5000 Unit(s) SubCutaneous every 12 hours  ibuprofen  Tablet. 600 milliGRAM(s) Oral every 6 hours  influenza   Vaccine 0.5 milliLiter(s) IntraMuscular once  lactated ringers Bolus 1000 milliLiter(s) IV Bolus once  magnesium sulfate Infusion 2 Gm/Hr (50 mL/Hr) IV Continuous <Continuous>  metoclopramide Injectable 5 milliGRAM(s) IV Push once  misoprostol Oral Solution 40 MICROGram(s) Oral every 2 hours  NIFEdipine XL 30 milliGRAM(s) Oral daily  oxytocin Infusion 333.333 milliUNIT(s)/Min (1000 mL/Hr) IV Continuous <Continuous>    MEDICATIONS  (PRN):  lanolin Ointment 1 Application(s) Topical every 6 hours PRN Sore Nipples  magnesium hydroxide Suspension 30 milliLiter(s) Oral two times a day PRN Constipation  oxyCODONE    IR 5 milliGRAM(s) Oral every 3 hours PRN Moderate to Severe Pain (4-10)  simethicone 80 milliGRAM(s) Chew every 4 hours PRN Gas      A/P: 27y  s/p admitted with severe range BPs postpartum, diagnosed w sPEC.    Neuro: PO pain meds.   CV: Hemodynamically stable.  BPs are       Anti hypertensive mediation  Pulm: Saturating well on room air, encourage oob/amb, incentive spirometer   GI: Continue regular diet  : UOP adequate  Heme: SCDs for DVT ppx  Dispo: Continue routine inpt care    Randy Antonio PGY-2 JAMAR FLORES [id number]    R3 Antepartum Progress Note    POD#2   HD#5    Patient seen and examined at bedside.  No acute events overnight. No acute complaints.  Pain well controlled.  Patient is ambulating and tolerating a regular diet.  Denies headache, shortness of breath, RUQ pain, epigastric pain.    Vital Signs Last 24 Hours  T(C): 36.8 (10-16-22 @ 00:32), Max: 36.9 (10-15-22 @ 17:30)  HR: 107 (10-16-22 @ 00:32) (82 - 115)  BP: 114/70 (10-16-22 @ 00:32) (105/70 - 123/81)  RR: 18 (10-16-22 @ 00:32) (17 - 18)  SpO2: 98% (10-16-22 @ 00:32) (97% - 100%)    I&O's Summary    14 Oct 2022 07:01  -  15 Oct 2022 07:00  --------------------------------------------------------  IN: 1425 mL / OUT: 8300 mL / NET: -6875 mL        Physical Exam:  General: NAD  CV: RR, S1, S2, no M/R/G  Lungs: CTA b/l  Abdomen: Soft, non tender  Ext: No pain or swelling     Labs:                        8.9    20.08 )-----------( 468      ( 15 Oct 2022 07:39 )             29.0   baso x      eos x      imm gran x      lymph x      mono x      poly x                            9.3    25.91 )-----------( 382      ( 14 Oct 2022 16:43 )             29.5   baso 0.0    eos 0.0    imm gran x      lymph 3.4    mono 7.8    poly 88.8                         10.4   23.83 )-----------( 403      ( 14 Oct 2022 03:34 )             33.3   baso 0.0    eos 0.0    imm gran x      lymph 3.5    mono 1.7    poly 94.8       MEDICATIONS  (STANDING):  acetaminophen     Tablet .. 975 milliGRAM(s) Oral <User Schedule>  heparin   Injectable 5000 Unit(s) SubCutaneous every 12 hours  ibuprofen  Tablet. 600 milliGRAM(s) Oral every 6 hours  influenza   Vaccine 0.5 milliLiter(s) IntraMuscular once  lactated ringers Bolus 1000 milliLiter(s) IV Bolus once  magnesium sulfate Infusion 2 Gm/Hr (50 mL/Hr) IV Continuous <Continuous>  metoclopramide Injectable 5 milliGRAM(s) IV Push once  misoprostol Oral Solution 40 MICROGram(s) Oral every 2 hours  NIFEdipine XL 30 milliGRAM(s) Oral daily  oxytocin Infusion 333.333 milliUNIT(s)/Min (1000 mL/Hr) IV Continuous <Continuous>    MEDICATIONS  (PRN):  lanolin Ointment 1 Application(s) Topical every 6 hours PRN Sore Nipples  magnesium hydroxide Suspension 30 milliLiter(s) Oral two times a day PRN Constipation  oxyCODONE    IR 5 milliGRAM(s) Oral every 3 hours PRN Moderate to Severe Pain (4-10)  simethicone 80 milliGRAM(s) Chew every 4 hours PRN Gas

## 2022-10-16 NOTE — DISCHARGE NOTE OB - HOSPITAL COURSE
Ms. Mendez presented at 40+wks gestation for elective induction of labor with newly diagnosed proteinuria.  She progressed to 6cm dilated and underwent a primary C/S for arrest of dilatation in active phase of labor.  C/S was performed without incident.  She required Procardia [id number] to be ordered to control her mild preeclampsia (which she developed after admission).  The remainder of her postpartum care was unremarkable.  She is being D/C'd home on POD#2 in stable condition to continue Procardia [id number] until her PP visit in 2 weeks.

## 2022-10-16 NOTE — DISCHARGE NOTE OB - CARE PLAN
Principal Discharge DX:	Single delivery by  section  Assessment and plan of treatment:	Reg Diet  Ad Shae Activity  F/U Dr Martin 2 weeks   1

## 2022-10-16 NOTE — PROGRESS NOTE ADULT - ASSESSMENT
28yo POD#2 s/p LTCS.  Patient is stable and doing well post-operatively.  Pregnancy complicated by severe preeclampsia      #sPEC   -s/p Magnesium   -Continue to monitor blood pressures  -HELLP labs 10/15 wnl  -Monitor blood pressures: overnight 110s/70s  -Email Cardio OB     #tachycardia   - Patient noted to be 100-110s   - EKG 10/15: sinus rhythm   - Appreciate cardiology recommendations: tachycardia likely in the setting of recent stressors and anemia, no intervention    #Post op  - Continue regular diet.  - OOB, Increase ambulation.  - Continue motrin, tylenol, oxycodone PRN for pain control      Randy Antonio PGY-3     26yo POD#2 s/p LTCS.  Patient is stable and doing well post-operatively.  Pregnancy complicated by severe preeclampsia      #sPEC   -s/p Magnesium   -Continue to monitor blood pressures  -HELLP labs 10/15 wnl  -Monitor blood pressures: overnight 110s/70s  -Email Cardio OB     #tachycardia   - Patient noted to be 100-110s   - EKG 10/15: sinus rhythm   - Appreciate cardiology recommendations: tachycardia likely in the setting of recent stressors and anemia, no intervention    #Post op  - Continue regular diet.  - OOB, Increase ambulation.      ATTG:  Pt seen and evaluated  BPs WNL on Procardia [id number]  Stable for D/C home POD#2 s/p primary C/S for FTP on Procardia  F/U Dr Martin 2 weeks  Complete D/C instructions given  Oxycodone for pain  - Continue motrin, tylenol, oxycodone PRN for pain control      Randy Antonio PGY-3

## 2022-10-16 NOTE — DISCHARGE NOTE OB - NS MD DC FALL RISK RISK
For information on Fall & Injury Prevention, visit: [email or web address] OR  [email or web address] OR  https://www.cdc.gov/steadi/patient.html

## 2022-10-18 ENCOUNTER — APPOINTMENT (OUTPATIENT)
Dept: OBGYN | Facility: CLINIC | Age: 27
End: 2022-10-18

## 2022-10-18 VITALS — DIASTOLIC BLOOD PRESSURE: 84 MMHG | SYSTOLIC BLOOD PRESSURE: 134 MMHG

## 2022-10-18 VITALS — DIASTOLIC BLOOD PRESSURE: 93 MMHG | SYSTOLIC BLOOD PRESSURE: 146 MMHG

## 2022-10-27 RX ORDER — OXYCODONE 5 MG/1
5 TABLET ORAL
Qty: 20 | Refills: 0 | Status: COMPLETED | COMMUNITY
Start: 2022-10-16 | End: 2022-10-27

## 2022-10-28 ENCOUNTER — APPOINTMENT (OUTPATIENT)
Dept: OBGYN | Facility: CLINIC | Age: 27
End: 2022-10-28

## 2022-10-28 VITALS
HEIGHT: 59 IN | SYSTOLIC BLOOD PRESSURE: 120 MMHG | DIASTOLIC BLOOD PRESSURE: 60 MMHG | WEIGHT: 143 LBS | BODY MASS INDEX: 28.83 KG/M2

## 2022-10-28 DIAGNOSIS — Z34.90 ENCOUNTER FOR SUPERVISION OF NORMAL PREGNANCY, UNSPECIFIED, UNSPECIFIED TRIMESTER: ICD-10-CM

## 2022-10-28 DIAGNOSIS — Z34.93 ENCOUNTER FOR SUPERVISION OF NORMAL PREGNANCY, UNSPECIFIED, THIRD TRIMESTER: ICD-10-CM

## 2022-10-28 PROCEDURE — 99080 SPECIAL REPORTS OR FORMS: CPT

## 2022-10-28 PROCEDURE — 0503F POSTPARTUM CARE VISIT: CPT

## 2022-10-28 NOTE — HISTORY OF PRESENT ILLNESS
[Postpartum Follow Up] : postpartum follow up [Complications:___] : complications include: [unfilled] [Primary C/S] : delivered by  section [Female] : Delivery History: baby girl [Wt. ___] : weighing [unfilled] [Intended Contraception] : Intended Contraception: [Breast Pain] : no breast pain [S/Sx PP Depression] : no signs/symptoms of postpartum depression [Incisional Drainage] : no incisional drainage [Shortness of Breath] : no shortness of breath [Incisional Pain] : no incisional pain [Suicidal Ideation] : no suicidal ideation [Chills] : no chills Lot # (Optional): [id number] [Fever] : no fever Lot # For Kenalog (Optional): [id number] [Headache] : no headache [Clean/Dry/Intact] : clean, dry and intact [Erythema] : not erythematous [Swelling] : not swollen [Healed] : healed [Doing Well] : is doing well [No Sign of Infection] : is showing no signs of infection [de-identified] : Tracy [de-identified] : rectus diastatsis

## 2022-11-22 ENCOUNTER — NON-APPOINTMENT (OUTPATIENT)
Age: 27
End: 2022-11-22

## 2022-12-01 NOTE — OB RN DELIVERY SUMMARY - NSDELAYEDCLAMPA_OBGYN_ALL_OB
Assessment/Plan:         Problem List Items Addressed This Visit        Endocrine    Idiopathic atrophic hypothyroidism - Primary     Under control  Continue current medication  We will re-evaluate at next office visit  Cardiovascular and Mediastinum    Benign essential hypertension     Under control  Continue current medication  We will re-evaluate at next office visit  Other    Sjogren's syndrome ([facility] [address] )   Other Visit Diagnoses     Encounter for immunization        Relevant Orders    influenza vaccine, quadrivalent, recombinant, PF, 0 5 mL, for patients 18 yr+ (FLUBLOK)    Pneumococcal Conjugate Vaccine 20-valent (PCV20)            Subjective:      Patient ID: Zaira Yuen is a 48 y o  female  Patient here for review of chronic medical problems and review of the labs and imaging if it is applicable  Currently has no specific complaints other than mentioned in the review of systems  Denies chest pain, SOB, cough, abdominal pain, nausea, vomiting, fever, chills, lightheadedness, dizziness,headache, tingling or numbness  No bowel or bladder problem  The following portions of the patient's history were reviewed and updated as appropriate:   Past Medical History:  She has a past medical history of Allergic, Asthma, Disease of thyroid gland, Endometriosis, High cholesterol, Hypertension (7/15/2021), Hypothyroidism, Obesity, Sjogren's syndrome ([facility] [address] ), and Stroke ([facility] )  ,  _______________________________________________________________________  Medical Problems:  does not have any pertinent problems on file ,  _______________________________________________________________________  Past Surgical History:   has a past surgical history that includes  section; [place] tooth extraction; Hemorroidectomy; and Mammo (historical) (Bilateral, 2017)  ,  _______________________________________________________________________  Family History:  family history includes Asthma in her mother; Bipolar disorder in her sister; Cancer in her maternal grandmother; Depression in her sister; Diabetes in her mother; Drug abuse in her sister; Heart disease in her father and mother; Hypertension in her father and mother; Mental illness in her sister; No Known Problems in her daughter, daughter, maternal aunt, paternal aunt, paternal grandfather, and paternal grandmother; Stroke in her maternal grandfather and maternal grandmother ,  _______________________________________________________________________  Social History:   reports that she has never smoked  She has never used smokeless tobacco  She reports that she does not currently use alcohol  She reports that she does not use drugs  ,  _______________________________________________________________________  Allergies:  is allergic to asa [aspirin] and avelox [moxifloxacin]     _______________________________________________________________________  Current Outpatient Medications   Medication Sig Dispense Refill   • albuterol (PROVENTIL HFA,VENTOLIN HFA) 90 mcg/act inhaler albuterol sulfate HFA 90 mcg/actuation aerosol inhaler   INHALE 2 PUFFS EVERY 4 HOURS AS NEEDED  MAY USE 2 PUFFS 20-30 MINUTES BEFORE PHYSICAL EXERTION     • atorvastatin (LIPITOR) 40 mg tablet TAKE 1 TABLET BY MOUTH EVERY DAY IN THE EVENING 90 tablet 0   • clopidogrel (PLAVIX) 75 mg tablet TAKE 1 TABLET BY MOUTH EVERY DAY 90 tablet 0   • loratadine (CLARITIN) 10 mg tablet Take 10 mg by mouth daily     • Mometasone Furoate (Asmanex HFA) 100 MCG/ACT AERO Take 100 mcg by mouth 2 (two) times a day     • Pediatric Multiple Vitamins (PC PEDIATRIC POLY-VITAMIN DROP PO) Take 1 drop by mouth in the morning     • Synthroid 112 MCG tablet TAKE 1 TABLET DAILY MONDAY THROUGH SATURDAY AND 1 & 1/2 TABLETS SUNDAY FOR HYPOTHYROIDISM 100 tablet 1   • triamcinolone (KENALOG) 0 1 % cream APPLY TO ECZEMA ON BACK TWICE DAILY FOR 2WEEKS THEN AS NEEDED FOR FLARES       • valsartan-hydrochlorothiazide (DIOVAN-HCT) 160-12 5 MG per tablet Take 1 tablet by mouth daily 90 tablet 0     No current facility-administered medications for this visit      _______________________________________________________________________  Review of Systems   Constitutional: Negative for chills, fatigue and fever  HENT: Negative for congestion, ear pain, rhinorrhea, sneezing and sore throat  Eyes: Negative for redness, itching and visual disturbance  Respiratory: Negative for cough, chest tightness and shortness of breath  Cardiovascular: Negative for chest pain, palpitations and leg swelling  Gastrointestinal: Negative for abdominal pain, blood in stool, diarrhea, nausea and vomiting  Endocrine: Negative for cold intolerance and heat intolerance  Genitourinary: Negative for dysuria, frequency and urgency  Musculoskeletal: Positive for arthralgias  Negative for back pain and myalgias  Skin: Negative for color change and rash  Neurological: Negative for dizziness, weakness, light-headedness, numbness and headaches  Hematological: Does not bruise/bleed easily  Psychiatric/Behavioral: Negative for agitation, behavioral problems and confusion  Objective:  Vitals:    12/01/22 1456   BP: 120/70   BP Location: Left arm   Patient Position: Sitting   Cuff Size: Large   Pulse: 68   Resp: 16   Temp: 98 7 °F (37 1 °C)   TempSrc: Temporal   SpO2: 98%   Weight: 84 5 kg (186 lb 3 2 oz)   Height: 5' 5" (1 651 m)     Body mass index is 30 99 kg/m²  Physical Exam  Vitals and nursing note reviewed  Constitutional:       General: She is not in acute distress  Appearance: Normal appearance  She is not ill-appearing, toxic-appearing or diaphoretic  HENT:      Head: Normocephalic and atraumatic  Right Ear: Tympanic membrane normal       Left Ear: Tympanic membrane normal       Nose: Nose normal  No congestion  Mouth/Throat:      Mouth: Mucous membranes are moist    Eyes:      General: No scleral icterus  Right eye: No discharge  Left eye: No discharge  Extraocular Movements: Extraocular movements intact  Conjunctiva/sclera: Conjunctivae normal       Pupils: Pupils are equal, round, and reactive to light  Cardiovascular:      Rate and Rhythm: Normal rate and regular rhythm  Pulses: Normal pulses  Heart sounds: Normal heart sounds  No murmur heard  No gallop  Pulmonary:      Effort: Pulmonary effort is normal  No respiratory distress  Breath sounds: Normal breath sounds  No wheezing, rhonchi or rales  Abdominal:      General: Abdomen is flat  Bowel sounds are normal  There is no distension  Palpations: Abdomen is soft  Tenderness: There is no abdominal tenderness  There is no guarding  Musculoskeletal:         General: No swelling or tenderness  Normal range of motion  Cervical back: Normal range of motion and neck supple  No rigidity  Lymphadenopathy:      Cervical: No cervical adenopathy  Skin:     General: Skin is warm  Capillary Refill: Capillary refill takes 2 to 3 seconds  Coloration: Skin is not jaundiced  Findings: No bruising or rash  Neurological:      General: No focal deficit present  Mental Status: She is alert and oriented to person, place, and time  Mental status is at baseline        Gait: Gait normal    Psychiatric:         Mood and Affect: Mood normal  Yes

## 2022-12-02 LAB — SURGICAL PATHOLOGY STUDY: SIGNIFICANT CHANGE UP

## 2022-12-28 ENCOUNTER — APPOINTMENT (OUTPATIENT)
Dept: OBGYN | Facility: CLINIC | Age: 27
End: 2022-12-28
Payer: COMMERCIAL

## 2022-12-28 VITALS
HEIGHT: 59 IN | DIASTOLIC BLOOD PRESSURE: 84 MMHG | WEIGHT: 147 LBS | SYSTOLIC BLOOD PRESSURE: 129 MMHG | BODY MASS INDEX: 29.64 KG/M2

## 2022-12-28 DIAGNOSIS — R60.0 LOCALIZED EDEMA: ICD-10-CM

## 2022-12-28 DIAGNOSIS — Z87.898 PERSONAL HISTORY OF OTHER SPECIFIED CONDITIONS: ICD-10-CM

## 2022-12-28 DIAGNOSIS — Z86.79 PERSONAL HISTORY OF OTHER DISEASES OF THE CIRCULATORY SYSTEM: ICD-10-CM

## 2022-12-28 PROCEDURE — 0503F POSTPARTUM CARE VISIT: CPT

## 2023-01-27 ENCOUNTER — NON-APPOINTMENT (OUTPATIENT)
Age: 28
End: 2023-01-27

## 2023-03-03 NOTE — OB PROVIDER TRIAGE NOTE - NS_DISCHARGEPRINT_OBGYN_ALL_OB
I called Fe Smith to review abnormal pap smear results. No answer, left VM to return my call. This is my third attempt to reach the pt. She has reviewed results in [employer], will also send [employer] message and plan to send certified letter if no returned call.     Pap smear returned normal, however HPV testing continues to show positive. This is also showing positive for a high risk genotype. I recommend a colposcopy with MD in our office (former [facility] pt)     LISA Casanova  3/3/23  12:49pm  
 OB Discharge Instructions

## 2023-08-01 ENCOUNTER — EMERGENCY (EMERGENCY)
Facility: HOSPITAL | Age: 28
LOS: 1 days | Discharge: HOME | End: 2023-08-01
Attending: EMERGENCY MEDICINE | Admitting: EMERGENCY MEDICINE
Payer: MEDICAID

## 2023-08-01 VITALS
RESPIRATION RATE: 18 BRPM | HEART RATE: 84 BPM | SYSTOLIC BLOOD PRESSURE: 113 MMHG | OXYGEN SATURATION: 100 % | DIASTOLIC BLOOD PRESSURE: 76 MMHG | TEMPERATURE: 99 F

## 2023-08-01 VITALS
TEMPERATURE: 99 F | RESPIRATION RATE: 18 BRPM | SYSTOLIC BLOOD PRESSURE: 150 MMHG | HEART RATE: 100 BPM | DIASTOLIC BLOOD PRESSURE: 98 MMHG | OXYGEN SATURATION: 100 %

## 2023-08-01 LAB
ALBUMIN SERPL ELPH-MCNC: 4.7 G/DL — SIGNIFICANT CHANGE UP (ref 3.3–5)
ALP SERPL-CCNC: 96 U/L — SIGNIFICANT CHANGE UP (ref 40–120)
ALT FLD-CCNC: 18 U/L — SIGNIFICANT CHANGE UP (ref 4–33)
ANION GAP SERPL CALC-SCNC: 9 MMOL/L — SIGNIFICANT CHANGE UP (ref 7–14)
AST SERPL-CCNC: 18 U/L — SIGNIFICANT CHANGE UP (ref 4–32)
BASOPHILS # BLD AUTO: 0.03 K/UL — SIGNIFICANT CHANGE UP (ref 0–0.2)
BASOPHILS NFR BLD AUTO: 0.2 % — SIGNIFICANT CHANGE UP (ref 0–2)
BILIRUB SERPL-MCNC: 0.7 MG/DL — SIGNIFICANT CHANGE UP (ref 0.2–1.2)
BUN SERPL-MCNC: 7 MG/DL — SIGNIFICANT CHANGE UP (ref 7–23)
CALCIUM SERPL-MCNC: 9.7 MG/DL — SIGNIFICANT CHANGE UP (ref 8.4–10.5)
CHLORIDE SERPL-SCNC: 105 MMOL/L — SIGNIFICANT CHANGE UP (ref 98–107)
CO2 SERPL-SCNC: 20 MMOL/L — LOW (ref 22–31)
CREAT SERPL-MCNC: 0.6 MG/DL — SIGNIFICANT CHANGE UP (ref 0.5–1.3)
EGFR: 125 ML/MIN/1.73M2 — SIGNIFICANT CHANGE UP
EOSINOPHIL # BLD AUTO: 0.13 K/UL — SIGNIFICANT CHANGE UP (ref 0–0.5)
EOSINOPHIL NFR BLD AUTO: 1.1 % — SIGNIFICANT CHANGE UP (ref 0–6)
GLUCOSE SERPL-MCNC: 95 MG/DL — SIGNIFICANT CHANGE UP (ref 70–99)
HCT VFR BLD CALC: 40.1 % — SIGNIFICANT CHANGE UP (ref 34.5–45)
HGB BLD-MCNC: 13.1 G/DL — SIGNIFICANT CHANGE UP (ref 11.5–15.5)
IANC: 9.17 K/UL — HIGH (ref 1.8–7.4)
IMM GRANULOCYTES NFR BLD AUTO: 0.5 % — SIGNIFICANT CHANGE UP (ref 0–0.9)
LYMPHOCYTES # BLD AUTO: 17.7 % — SIGNIFICANT CHANGE UP (ref 13–44)
LYMPHOCYTES # BLD AUTO: 2.12 K/UL — SIGNIFICANT CHANGE UP (ref 1–3.3)
MCHC RBC-ENTMCNC: 27.8 PG — SIGNIFICANT CHANGE UP (ref 27–34)
MCHC RBC-ENTMCNC: 32.7 GM/DL — SIGNIFICANT CHANGE UP (ref 32–36)
MCV RBC AUTO: 85 FL — SIGNIFICANT CHANGE UP (ref 80–100)
MONOCYTES # BLD AUTO: 0.5 K/UL — SIGNIFICANT CHANGE UP (ref 0–0.9)
MONOCYTES NFR BLD AUTO: 4.2 % — SIGNIFICANT CHANGE UP (ref 2–14)
NEUTROPHILS # BLD AUTO: 9.17 K/UL — HIGH (ref 1.8–7.4)
NEUTROPHILS NFR BLD AUTO: 76.3 % — SIGNIFICANT CHANGE UP (ref 43–77)
NRBC # BLD: 0 /100 WBCS — SIGNIFICANT CHANGE UP (ref 0–0)
NRBC # FLD: 0 K/UL — SIGNIFICANT CHANGE UP (ref 0–0)
PLATELET # BLD AUTO: 479 K/UL — HIGH (ref 150–400)
POTASSIUM SERPL-MCNC: 3.7 MMOL/L — SIGNIFICANT CHANGE UP (ref 3.5–5.3)
POTASSIUM SERPL-SCNC: 3.7 MMOL/L — SIGNIFICANT CHANGE UP (ref 3.5–5.3)
PROT SERPL-MCNC: 8.2 G/DL — SIGNIFICANT CHANGE UP (ref 6–8.3)
RBC # BLD: 4.72 M/UL — SIGNIFICANT CHANGE UP (ref 3.8–5.2)
RBC # FLD: 13.2 % — SIGNIFICANT CHANGE UP (ref 10.3–14.5)
SODIUM SERPL-SCNC: 134 MMOL/L — LOW (ref 135–145)
WBC # BLD: 12.01 K/UL — HIGH (ref 3.8–10.5)
WBC # FLD AUTO: 12.01 K/UL — HIGH (ref 3.8–10.5)

## 2023-08-01 PROCEDURE — 99284 EMERGENCY DEPT VISIT MOD MDM: CPT

## 2023-08-01 PROCEDURE — 71046 X-RAY EXAM CHEST 2 VIEWS: CPT | Mod: 26

## 2023-08-01 PROCEDURE — 71250 CT THORAX DX C-: CPT | Mod: 26,MA

## 2023-08-01 RX ORDER — FAMOTIDINE 10 MG/ML
20 INJECTION INTRAVENOUS ONCE
Refills: 0 | Status: COMPLETED | OUTPATIENT
Start: 2023-08-01 | End: 2023-08-01

## 2023-08-01 RX ADMIN — FAMOTIDINE 20 MILLIGRAM(S): 10 INJECTION INTRAVENOUS at 10:37

## 2023-08-01 NOTE — ED PROVIDER NOTE - PROGRESS NOTE DETAILS
DD ED ATTG: rec'd s/o from DR barragan. 27F kathleen shaffer ingested, feeling some chest discomfort.  Awaiting CT.  (+)N/V streaks of blood.  CT not back, pt wanting to leave.  Pt has to do .  Pt aware FB may still be there, still wants to leave. DD ED ATTG: rec'd s/o from DR barragan. 27F kathleen shaffer ingested, feeling some chest discomfort.  Awaiting CT.  (+)N/V streaks of blood.  CT not back, pt wanting to leave.  Pt has to do .  Pt aware FB may still be there, still wants to leave.  Pt in NAD.  Pt's cell

## 2023-08-01 NOTE — ED PROVIDER NOTE - PATIENT PORTAL LINK FT
You can access the FollowMyHealth Patient Portal offered by [facility] by registering at the following website: [email or web address]. By joining [employer]’s FollowMyHealth portal, you will also be able to view your health information using other applications (apps) compatible with our system.

## 2023-08-01 NOTE — ED PROVIDER NOTE - NSFOLLOWUPINSTRUCTIONS_ED_ALL_ED_FT
FOLLOW UP WITH YOUR  DOCTOR WITHIN 1 WEEK  BRING THE COPIES OF YOUR RESULTS WITH YOU (PROVIDED)  CAN TAKE TYLENOL 650MG ORALLY EVERY 6 HOURS FOR PAIN OR FEVER.  PEPCID 20mg ORALLY EVERY 12 HOURS FOR 14 DAYS.    RETURN TO ED FOR NEW OR WORSENING SYMPTOMS.

## 2023-08-01 NOTE — ED ADULT TRIAGE NOTE - CHIEF COMPLAINT QUOTE
Pt presents to ED ambulatory from home with c/o foreign body to throat. Pt reports drinking out of a bottle last night and swallowing a piece of the plastic. Pt denies difficulty breathing or painful swallowing. Pt speaking in full sentences, no drooling noted. Pt endorses when she swallows anything, it comes back up.

## 2023-08-01 NOTE — ED PROVIDER NOTE - ATTENDING CONTRIBUTION TO CARE
Attending note:   After face to face evaluation of this patient, I concur with above noted hx, pe, and care plan for this patient.  Murphy: 27-year-old female with complaints of foreign body stuck in her chest.  Patient was drinking from the lemonade bottles yesterday when a plastic part of the Was swallowed.  Patient noted foreign body sensation immediately as it was being swallowed and felt to get stuck in her chest.  Patient had nausea vomiting immediately with some blood streaks.  Since then patient feels sensation of foreign body in her chest that goes up and down with swallowing.  Patient has not tried p.o. challenge since then but states she feels very uncomfortable.  There is no throat pain or difficulty breathing.  On exam lungs are clear but patient does appear uncomfortable and very concerned.  Oropharynx is clear.  Abdomen is soft nontender.  Heart is regular rate and rhythm.  Given patient's symptoms of foreign body sensation in chest we will get CT chest.  There was initial concern of possible decreased breath sounds on one side but this appears to have resolved.  Chest x-ray already performed and appears clear.  Once CT is done can consider glucagon and Zofran but for now we will give Pepcid and basic labs.  Patient will have to p.o. challenge prior to discharge.

## 2023-08-01 NOTE — ED PROVIDER NOTE - OBJECTIVE STATEMENT
28 y/o F pt with no significant PMHx presents to the ED s/p swallowing a foreign body at 12 hours ago. Notes that she was taking a sip of juice when the plastic piece sealant on the opening of the drink broke off and she felt it as she swallowed the juice. She had one episode of vomiting x1 hour after the incidence. Notes there was some blood streaked in the vomit. No other vomiting episodes. States that she can feel the plastic piece moving up and down her chest since the incidence. Has not tried to eat anything but tried to drink some coffee in the morning. Denies fever, chills, abdominal pain.

## 2023-08-01 NOTE — ED PROVIDER NOTE - RESPIRATORY, MLM
Patient is out of his Atorvastatin, his Metformin, and his Bupropion. Please reorder from the [facility] in [place] on [address]   Breath sounds clear and equal bilaterally.

## 2023-10-02 ENCOUNTER — NON-APPOINTMENT (OUTPATIENT)
Age: 28
End: 2023-10-02

## 2023-10-10 NOTE — OB RN TRIAGE NOTE - PAIN: DESCRIPTION (FREQUENCY/QUALITY)
Virtual Visit: Established Patient   This visit was conducted via Zoom using secure and encrypted videoconferencing technology.   The patient was in their home in the [facility].    The patient's identity was confirmed and verbal consent was obtained for this virtual visit.     Subjective:   CC:   Chief Complaint   Patient presents with    Follow-Up       Rosita Bowles is a 29 y.o. female presenting for evaluation and management of:    1. H/O elbow surgery  Patient had surgery through orthopedics office for left elbow tendinopathy me.  Surgery was yesterday, still taking pain medications and recovering.    2. Constipation, unspecified constipation type  Reports constipation, patient on pain medication from her recent surgery.    Chronic GI issues and changes in bowel habits:    Reviewed recent records from gastroenterologist, patient underwent colonoscopy, evidence of mild inflammation noted without other significant findings.      ROS       Current medicines (including changes today)  Current Outpatient Medications   Medication Sig Dispense Refill    docusate sodium (COLACE) 100 MG Cap Take 1 Capsule by mouth 2 times a day. 60 Capsule 1    estrogens, conjugated (PREMARIN) 0.625 MG/GM Cream Insert 0.5 g into the vagina every day. 30 g 1    buPROPion (WELLBUTRIN XL) 300 MG XL tablet Take 1 Tablet by mouth every morning. 90 Tablet 1    methylphenidate (CONCERTA) 54 MG CR tablet Take 1 Tablet by mouth every morning for 30 days. 30 Tablet 0    [START ON 11/4/2023] methylphenidate (CONCERTA) 54 MG CR tablet Take 1 Tablet by mouth every morning for 30 days. 30 Tablet 0    lidocaine (LIDODERM) 5 % Patch Place 1 Patch on the skin every 24 hours. 10 Patch 1    fluconazole (DIFLUCAN) 150 MG tablet Take 1 Tablet by mouth every day. 1 Tablet 0    valacyclovir (VALTREX) 1 GM Tab Take 1 Tablet by mouth 2 times a day as needed (cold sores). 180 Tablet 3    phenazopyridine (PYRIDIUM) 200 MG Tab Take 1 Tablet by  mouth 3 times a day as needed (urinary frequency). 6 Tablet 0    albuterol 108 (90 Base) MCG/ACT Aero Soln inhalation aerosol INHALE 2 PUFFS EVERY 4 HOURS AS NEEDED FOR SHORTNESS OF BREATH 6.7 Each 1    celecoxib (CELEBREX) 100 MG Cap Take 1-2 Capsules by mouth 2 times a day. 60 Capsule 1    tizanidine (ZANAFLEX) 4 MG Tab Take 1 Tablet by mouth every 6 hours as needed (back pain and muscle spasm). 30 Tablet 1    fluconazole (DIFLUCAN) 150 MG tablet Take 1 Tablet by mouth every day. 2 Tablet 0    albuterol 108 (90 Base) MCG/ACT Aero Soln inhalation aerosol INHALE 2 PUFFS EVERY FOUR HOURS AS NEEDED FOR SHORTNESS OF BREATH. 6.7 Each 3    ferrous sulfate 325 (65 Fe) MG tablet Take 325 mg by mouth every day.      levonorgestrel (MIRENA, 52 MG,) 52 mg (20 mcg/24 hr) IUD 1 Each by Intrauterine route one time.      ocrelizumab (OCREVUS) 300 MG/10ML Solution injection Infuse  into a venous catheter.      Clindamycin Phos-Benzoyl Perox (ONEXTON) 1.2-3.75 % Gel 1 Application by Apply externally route every morning. To entire face 30 g 3    Biotin 5000 MCG Cap Take 5,000 mcg by mouth every day at 6 PM. supplement      melatonin 3 MG Tab Take 3 mg by mouth 1 time daily as needed (insomnia).      Cyanocobalamin (VITAMIN B-12) 1000 MCG Tab Take 1,000 mcg by mouth every day. supplement      Cholecalciferol (VITAMIN D3) 5000 units Tab Take 5,000 Units by mouth every day. supplement       No current facility-administered medications for this visit.       Patient Active Problem List    Diagnosis Date Noted    Dyspareunia in female 09/19/2023    Rash 08/22/2022    JOSE (generalized anxiety disorder) 06/17/2021    Establishing care with new doctor, encounter for 03/18/2021    Health care maintenance 03/18/2021    Other viral warts 04/02/2019    Plantar wart 01/09/2019    Cervical disc disorder at C6-C7 level with radiculopathy 07/10/2018    Primary insomnia 10/08/2017    HSV-1 (herpes simplex virus 1) infection 06/23/2017    Sinus  "pressure 05/03/2017    Multiple sclerosis (HCC) 11/02/2016    Seasonal allergies 05/31/2013    Recurrent major depressive disorder, in partial remission (HCC) 08/07/2012    ADHD 08/07/2012        Objective:   Ht 1.778 m (5' 10\")   Wt 70.3 kg (155 lb) Comment: virtual  LMP 08/21/2023 (Exact Date)   BMI 22.24 kg/m²     Physical Exam:  Constitutional: Alert, no distress, well-groomed.  Skin: No rashes in visible areas.  Eye: Round. Conjunctiva clear, lids normal. No icterus.   ENMT: Lips pink without lesions, good dentition, moist mucous membranes. Phonation normal.  Neck: No masses, no thyromegaly. Moves freely without pain.  Respiratory: Unlabored respiratory effort, no cough or audible wheeze  Psych: Alert and oriented x3, normal affect and mood.     Assessment and Plan:   The following treatment plan was discussed:     1. H/O elbow surgery  Recovering, continue follow-up with orthopedics and continue pain medication add Colace for stool softener.  To avoid constipation  2. Constipation, unspecified constipation type  New concern, other chronic GI issues patient is already established with GI.  Reviewed records and colonoscopy records.  Advised to continue with Colace, add probiotics, hydration and fiber discussed with the patient if no resolution of her chronic concerns to report back to GI.  - docusate sodium (COLACE) 100 MG Cap; Take 1 Capsule by mouth 2 times a day.  Dispense: 60 Capsule; Refill: 1      Follow-up: No follow-ups on file.          " constant

## 2023-10-20 NOTE — ED PROVIDER NOTE - SKIN AREA #1
Msg rec'd 10-19-23 @ 1658:  Dana Philippe MD Gorshe, Maureen, RN  Mr. Samuels had a normal stress test.  He may proceed with his knee surgery as planned.  No further cardiac testing needed.  I would recommend that the surgery be done in hospital rather than an ambulatory care surgery center due to his comorbidities.    Phone call to patient's daughter Eloisa who had sent [employer] msg to Dr. Philippe on behalf of patient - informed Eloisa of Dr. Philippe's response which was also sent as [employer] msg and inquired if letter needs to be sent to orthopedic surgeon.  Eloisa stated patient was contacted by orthopedic surgeon's  yesterday to set up surgery so they may have already rec'd update.  Eloisa agreed to send [employer] msg with ortho info if note needs to be faxed.  mg   anterior

## 2023-10-23 NOTE — OB RN TRIAGE NOTE - NS_PRENATALCARE_OBGYN_ALL_OB

## 2023-11-30 ENCOUNTER — APPOINTMENT (OUTPATIENT)
Dept: OBGYN | Facility: CLINIC | Age: 28
End: 2023-11-30
Payer: COMMERCIAL

## 2023-11-30 VITALS
WEIGHT: 139 LBS | BODY MASS INDEX: 28.02 KG/M2 | HEIGHT: 59 IN | DIASTOLIC BLOOD PRESSURE: 84 MMHG | SYSTOLIC BLOOD PRESSURE: 126 MMHG

## 2023-11-30 DIAGNOSIS — Z01.419 ENCOUNTER FOR GYNECOLOGICAL EXAMINATION (GENERAL) (ROUTINE) W/OUT ABNORMAL FINDINGS: ICD-10-CM

## 2023-11-30 DIAGNOSIS — R21 RASH AND OTHER NONSPECIFIC SKIN ERUPTION: ICD-10-CM

## 2023-11-30 PROCEDURE — 99395 PREV VISIT EST AGE 18-39: CPT

## 2023-11-30 RX ORDER — NORETHINDRONE ACETATE AND ETHINYL ESTRADIOL, ETHINYL ESTRADIOL AND FERROUS FUMARATE 1MG-10(24)
1 MG-10 MCG / KIT ORAL DAILY
Qty: 3 | Refills: 1 | Status: DISCONTINUED | COMMUNITY
Start: 2023-01-26 | End: 2023-11-30

## 2023-11-30 RX ORDER — NIFEDIPINE 30 MG/1
30 TABLET, FILM COATED, EXTENDED RELEASE ORAL DAILY
Qty: 30 | Refills: 1 | Status: COMPLETED | COMMUNITY
Start: 2022-10-16 | End: 2023-11-30

## 2023-12-01 LAB — HPV HIGH+LOW RISK DNA PNL CVX: NOT DETECTED

## 2023-12-13 DIAGNOSIS — B37.31 ACUTE CANDIDIASIS OF VULVA AND VAGINA: ICD-10-CM

## 2023-12-13 LAB — CYTOLOGY CVX/VAG DOC THIN PREP: ABNORMAL

## 2023-12-18 ENCOUNTER — APPOINTMENT (OUTPATIENT)
Dept: OBGYN | Facility: CLINIC | Age: 28
End: 2023-12-18

## 2023-12-28 ENCOUNTER — APPOINTMENT (OUTPATIENT)
Dept: OBGYN | Facility: CLINIC | Age: 28
End: 2023-12-28
Payer: COMMERCIAL

## 2023-12-28 VITALS — BODY MASS INDEX: 28.07 KG/M2 | WEIGHT: 139 LBS | SYSTOLIC BLOOD PRESSURE: 96 MMHG | DIASTOLIC BLOOD PRESSURE: 60 MMHG

## 2023-12-28 DIAGNOSIS — Z30.430 ENCOUNTER FOR INSERTION OF INTRAUTERINE CONTRACEPTIVE DEVICE: ICD-10-CM

## 2023-12-28 PROCEDURE — 58300 INSERT INTRAUTERINE DEVICE: CPT

## 2023-12-28 PROCEDURE — 81025 URINE PREGNANCY TEST: CPT

## 2024-02-14 RX ORDER — FLUCONAZOLE 150 MG/1
150 TABLET ORAL
Qty: 2 | Refills: 2 | Status: COMPLETED | COMMUNITY
Start: 2023-12-13 | End: 2024-02-14

## 2024-02-14 NOTE — PROCEDURE
[IUD Placement] : intrauterine device (IUD) placement [Infection] : infection [Bleeding] : bleeding [Pain] : pain [Expulsion] : expulsion [Failure] : failure [Uterine Perforation] : uterine perforation [Neg Pregnancy Test] : negative pregnancy test [Ibuprofen ___ mg] : ibuprofen [unfilled] ~Umg [Tenaculum] : Tenaculum [Easy Passage] : Easy passage [ParaGard IUD] : ParaGard IUD [US Guidance] : US Guidance [Tolerated Well] : Patient tolerated the procedure well [de-identified] : lidocaine 8ml 1% [de-identified] : see transabdominal sono for placment

## 2024-04-04 ENCOUNTER — APPOINTMENT (OUTPATIENT)
Dept: OBGYN | Facility: CLINIC | Age: 29
End: 2024-04-04
Payer: COMMERCIAL

## 2024-04-04 VITALS
DIASTOLIC BLOOD PRESSURE: 75 MMHG | BODY MASS INDEX: 27.82 KG/M2 | SYSTOLIC BLOOD PRESSURE: 120 MMHG | WEIGHT: 138 LBS | HEIGHT: 59 IN

## 2024-04-04 DIAGNOSIS — Z30.431 ENCOUNTER FOR ROUTINE CHECKING OF INTRAUTERINE CONTRACEPTIVE DEVICE: ICD-10-CM

## 2024-04-04 PROCEDURE — 99395 PREV VISIT EST AGE 18-39: CPT

## 2024-05-03 ENCOUNTER — APPOINTMENT (OUTPATIENT)
Dept: CARDIOLOGY | Facility: CLINIC | Age: 29
End: 2024-05-03
Payer: COMMERCIAL

## 2024-05-03 ENCOUNTER — APPOINTMENT (OUTPATIENT)
Dept: ELECTROPHYSIOLOGY | Facility: CLINIC | Age: 29
End: 2024-05-03
Payer: COMMERCIAL

## 2024-05-03 VITALS
DIASTOLIC BLOOD PRESSURE: 87 MMHG | OXYGEN SATURATION: 100 % | BODY MASS INDEX: 28.02 KG/M2 | SYSTOLIC BLOOD PRESSURE: 124 MMHG | HEIGHT: 59 IN | HEART RATE: 89 BPM | WEIGHT: 139 LBS

## 2024-05-03 DIAGNOSIS — Z00.00 ENCOUNTER FOR GENERAL ADULT MEDICAL EXAMINATION W/OUT ABNORMAL FINDINGS: ICD-10-CM

## 2024-05-03 PROCEDURE — 99214 OFFICE O/P EST MOD 30 MIN: CPT | Mod: 25

## 2024-05-03 PROCEDURE — 93000 ELECTROCARDIOGRAM COMPLETE: CPT

## 2024-05-03 RX ORDER — TRIAMCINOLONE ACETONIDE 5 MG/G
0.5 OINTMENT TOPICAL TWICE DAILY
Qty: 1 | Refills: 0 | Status: DISCONTINUED | COMMUNITY
Start: 2023-11-30 | End: 2024-05-03

## 2024-05-04 NOTE — HISTORY OF PRESENT ILLNESS
[FreeTextEntry1] : Ms. JAMAR FLORES 26 year old F  for [employer] is here Jul 07, 2022 to establish care in the Women's heart health program. She carries a family medical history of HTN and no personal past medical history is currently GA 26 5/7 LMP 1/2/22, EDC 10/9/22 presents in with complaints of palpitations. She reports having a sensation of skipped beats and fluttering in chest lasting associated with shortness of breath for few seconds occurring thorough the day. she drinks a cup of coffee daily . Admits to drinking at least 4- 6 16 oz phuong spring water bottles.    Denies chest pain, dizziness, lightheadedness, or near syncope or syncope, orthopnea, PND and increasing lower extremity edema.     # Palpitations:  BP Stable   Echocardiogram to assess the structural and valvular function. Event Monitor x 7 days  Additionally, encouraged heart healthy diet and exercise as tolerated. Encouraged patient to continue healthy exercise and eating habits, focusing on a Mediterranean style of eating and aiming for the recommended 150 minutes per week of moderate physical activity. Routine labs ordered - CBC, CMP, LIPIDS, TSH, Vit D, A1C   Follow up in 6 months

## 2024-05-04 NOTE — END OF VISIT
[FreeTextEntry3] : I, Dr. Mila Epps, personally performed the evaluation and management (E/M) services for this established patient who presents today with (a) new problem(s)/exacerbation of (an) existing condition(s).  That E/M includes conducting the examination, assessing all new/exacerbated conditions, and establishing a new plan of care.  Today, my ACP, was here to observe my evaluation and management services for this new problem/exacerbated condition to be followed going forward. [Time Spent: ___ minutes] : I have spent [unfilled] minutes of time on the encounter.

## 2024-05-04 NOTE — DISCUSSION/SUMMARY
[FreeTextEntry1] : In summary, Ms. JAMAR FLORES 26 year old F  for [employer] carries a family medical history of HTN and no personal past medical history is currently s/p 10/14/22 @ GA 40.6 weeks presents in with complaints of palpitations.  # Palpitations:  BP Stable   Echocardiogram to assess the structural and valvular function. Event Monitor x 7 days  Additionally, encouraged heart healthy diet and exercise as tolerated. Encouraged patient to continue healthy exercise and eating habits, focusing on a Mediterranean style of eating and aiming for the recommended 150 minutes per week of moderate physical activity. Routine labs ordered - CBC, CMP, LIPIDS, TSH, Vit D, A1C   Follow up in 6 months

## 2024-05-05 LAB
ALBUMIN SERPL ELPH-MCNC: 4.2 G/DL
ALP BLD-CCNC: 100 U/L
ALT SERPL-CCNC: 34 U/L
ANION GAP SERPL CALC-SCNC: 11 MMOL/L
AST SERPL-CCNC: 22 U/L
BILIRUB SERPL-MCNC: 0.2 MG/DL
BUN SERPL-MCNC: 7 MG/DL
CALCIUM SERPL-MCNC: 9.7 MG/DL
CHLORIDE SERPL-SCNC: 103 MMOL/L
CHOLEST SERPL-MCNC: 166 MG/DL
CO2 SERPL-SCNC: 24 MMOL/L
CREAT SERPL-MCNC: 0.53 MG/DL
EGFR: 128 ML/MIN/1.73M2
ESTIMATED AVERAGE GLUCOSE: 91 MG/DL
GLUCOSE SERPL-MCNC: 81 MG/DL
HBA1C MFR BLD HPLC: 4.8 %
HDLC SERPL-MCNC: 32 MG/DL
LDLC SERPL CALC-MCNC: 119 MG/DL
NONHDLC SERPL-MCNC: 135 MG/DL
POTASSIUM SERPL-SCNC: 4.2 MMOL/L
PROT SERPL-MCNC: 7.3 G/DL
SODIUM SERPL-SCNC: 138 MMOL/L
TRIGL SERPL-MCNC: 80 MG/DL
TSH SERPL-ACNC: 0.85 UIU/ML

## 2024-05-06 DIAGNOSIS — E78.9 DISORDER OF LIPOPROTEIN METABOLISM, UNSPECIFIED: ICD-10-CM

## 2024-05-17 PROCEDURE — 93244 EXT ECG>48HR<7D REV&INTERPJ: CPT

## 2024-05-24 ENCOUNTER — NON-APPOINTMENT (OUTPATIENT)
Age: 29
End: 2024-05-24

## 2024-05-27 ENCOUNTER — RESULT REVIEW (OUTPATIENT)
Age: 29
End: 2024-05-27

## 2024-05-28 ENCOUNTER — APPOINTMENT (OUTPATIENT)
Dept: CV DIAGNOSTICS | Facility: HOSPITAL | Age: 29
End: 2024-05-28

## 2024-05-28 ENCOUNTER — OUTPATIENT (OUTPATIENT)
Dept: OUTPATIENT SERVICES | Facility: HOSPITAL | Age: 29
LOS: 1 days | End: 2024-05-28
Payer: COMMERCIAL

## 2024-05-28 DIAGNOSIS — Z87.898 PERSONAL HISTORY OF OTHER SPECIFIED CONDITIONS: ICD-10-CM

## 2024-05-28 PROCEDURE — 93016 CV STRESS TEST SUPVJ ONLY: CPT

## 2024-05-28 PROCEDURE — 93018 CV STRESS TEST I&R ONLY: CPT

## 2024-05-28 PROCEDURE — 93017 CV STRESS TEST TRACING ONLY: CPT

## 2024-10-25 ENCOUNTER — TRANSCRIPTION ENCOUNTER (OUTPATIENT)
Age: 29
End: 2024-10-25

## 2024-11-05 ENCOUNTER — APPOINTMENT (OUTPATIENT)
Dept: OBGYN | Facility: CLINIC | Age: 29
End: 2024-11-05
Payer: COMMERCIAL

## 2024-11-05 VITALS
BODY MASS INDEX: 26.46 KG/M2 | HEIGHT: 59 IN | WEIGHT: 131.25 LBS | DIASTOLIC BLOOD PRESSURE: 84 MMHG | SYSTOLIC BLOOD PRESSURE: 127 MMHG

## 2024-11-05 DIAGNOSIS — Z30.432 ENCOUNTER FOR REMOVAL OF INTRAUTERINE CONTRACEPTIVE DEVICE: ICD-10-CM

## 2024-11-05 PROCEDURE — 58301 REMOVE INTRAUTERINE DEVICE: CPT

## 2024-11-05 RX ORDER — NORETHINDRONE ACETATE AND ETHINYL ESTRADIOL AND FERROUS FUMARATE 1MG-20(24)
1-20 KIT ORAL
Qty: 28 | Refills: 6 | Status: ACTIVE | COMMUNITY
Start: 2024-11-05 | End: 1900-01-01

## 2025-03-31 PROCEDURE — 99214 OFFICE O/P EST MOD 30 MIN: CPT | Mod: 95

## 2025-04-25 ENCOUNTER — APPOINTMENT (OUTPATIENT)
Dept: FAMILY MEDICINE | Facility: CLINIC | Age: 30
End: 2025-04-25
Payer: COMMERCIAL

## 2025-04-25 VITALS
OXYGEN SATURATION: 100 % | TEMPERATURE: 97 F | WEIGHT: 137 LBS | HEART RATE: 86 BPM | SYSTOLIC BLOOD PRESSURE: 97 MMHG | HEIGHT: 59 IN | DIASTOLIC BLOOD PRESSURE: 70 MMHG | BODY MASS INDEX: 27.62 KG/M2

## 2025-04-25 DIAGNOSIS — Z13.1 ENCOUNTER FOR SCREENING FOR DIABETES MELLITUS: ICD-10-CM

## 2025-04-25 DIAGNOSIS — E55.9 VITAMIN D DEFICIENCY, UNSPECIFIED: ICD-10-CM

## 2025-04-25 DIAGNOSIS — Z00.00 ENCOUNTER FOR GENERAL ADULT MEDICAL EXAMINATION W/OUT ABNORMAL FINDINGS: ICD-10-CM

## 2025-04-25 DIAGNOSIS — Z11.3 ENCOUNTER FOR SCREENING FOR INFECTIONS WITH A PREDOMINANTLY SEXUAL MODE OF TRANSMISSION: ICD-10-CM

## 2025-04-25 PROCEDURE — 99385 PREV VISIT NEW AGE 18-39: CPT

## 2025-04-26 ENCOUNTER — APPOINTMENT (OUTPATIENT)
Dept: FAMILY MEDICINE | Facility: CLINIC | Age: 30
End: 2025-04-26

## 2025-04-29 LAB
25(OH)D3 SERPL-MCNC: 16.2 NG/ML
ALBUMIN SERPL ELPH-MCNC: 4.4 G/DL
ALP BLD-CCNC: 78 U/L
ALT SERPL-CCNC: 12 U/L
ANION GAP SERPL CALC-SCNC: 12 MMOL/L
APPEARANCE: CLEAR
AST SERPL-CCNC: 14 U/L
BASOPHILS # BLD AUTO: 0.05 K/UL
BASOPHILS NFR BLD AUTO: 0.5 %
BILIRUB SERPL-MCNC: 0.4 MG/DL
BILIRUBIN URINE: NEGATIVE
BLOOD URINE: NEGATIVE
BUN SERPL-MCNC: 8 MG/DL
C TRACH RRNA SPEC QL NAA+PROBE: NOT DETECTED
CALCIUM SERPL-MCNC: 9.6 MG/DL
CHLORIDE SERPL-SCNC: 104 MMOL/L
CHOLEST SERPL-MCNC: 189 MG/DL
CO2 SERPL-SCNC: 24 MMOL/L
COLOR: YELLOW
CREAT SERPL-MCNC: 0.55 MG/DL
EGFRCR SERPLBLD CKD-EPI 2021: 126 ML/MIN/1.73M2
EOSINOPHIL # BLD AUTO: 0.34 K/UL
EOSINOPHIL NFR BLD AUTO: 3.6 %
ESTIMATED AVERAGE GLUCOSE: 103 MG/DL
GLUCOSE QUALITATIVE U: NEGATIVE MG/DL
GLUCOSE SERPL-MCNC: 85 MG/DL
HBA1C MFR BLD HPLC: 5.2 %
HCT VFR BLD CALC: 39.3 %
HCV AB SER QL: NONREACTIVE
HCV S/CO RATIO: 0.14 S/CO
HDLC SERPL-MCNC: 39 MG/DL
HGB BLD-MCNC: 12.8 G/DL
HIV1+2 AB SPEC QL IA.RAPID: NONREACTIVE
IMM GRANULOCYTES NFR BLD AUTO: 0.3 %
KETONES URINE: ABNORMAL MG/DL
LDLC SERPL-MCNC: 138 MG/DL
LEUKOCYTE ESTERASE URINE: NEGATIVE
LYMPHOCYTES # BLD AUTO: 2.58 K/UL
LYMPHOCYTES NFR BLD AUTO: 27.7 %
MAN DIFF?: NORMAL
MCHC RBC-ENTMCNC: 29.8 PG
MCHC RBC-ENTMCNC: 32.6 G/DL
MCV RBC AUTO: 91.4 FL
MONOCYTES # BLD AUTO: 0.47 K/UL
MONOCYTES NFR BLD AUTO: 5 %
N GONORRHOEA RRNA SPEC QL NAA+PROBE: NOT DETECTED
NEUTROPHILS # BLD AUTO: 5.86 K/UL
NEUTROPHILS NFR BLD AUTO: 62.9 %
NITRITE URINE: NEGATIVE
NONHDLC SERPL-MCNC: 150 MG/DL
PH URINE: 5.5
PLATELET # BLD AUTO: 446 K/UL
POTASSIUM SERPL-SCNC: 4.5 MMOL/L
PROT SERPL-MCNC: 7.5 G/DL
PROTEIN URINE: NEGATIVE MG/DL
RBC # BLD: 4.3 M/UL
RBC # FLD: 13.1 %
SODIUM SERPL-SCNC: 141 MMOL/L
SOURCE AMPLIFICATION: NORMAL
SPECIFIC GRAVITY URINE: 1.03
T PALLIDUM AB SER QL IA: NEGATIVE
TRIGL SERPL-MCNC: 64 MG/DL
TSH SERPL-ACNC: 0.5 UIU/ML
UROBILINOGEN URINE: 1 MG/DL
WBC # FLD AUTO: 9.33 K/UL

## 2025-04-29 RX ORDER — ERGOCALCIFEROL 1.25 MG/1
1.25 MG CAPSULE ORAL
Qty: 8 | Refills: 0 | Status: ACTIVE | COMMUNITY
Start: 2025-04-29 | End: 1900-01-01

## 2025-05-01 LAB
HSV 1 IGG TYPE-SPECIFIC AB: 49 INDEX
HSV 2 IGG TYPE-SPECIFIC AB: <0.9 INDEX

## 2025-06-06 ENCOUNTER — APPOINTMENT (OUTPATIENT)
Dept: OBGYN | Facility: CLINIC | Age: 30
End: 2025-06-06

## 2025-06-21 ENCOUNTER — RX RENEWAL (OUTPATIENT)
Age: 30
End: 2025-06-21

## 2025-06-21 RX ORDER — ERGOCALCIFEROL 1.25 MG/1
1.25 MG CAPSULE, LIQUID FILLED ORAL
Qty: 8 | Refills: 0 | Status: ACTIVE | COMMUNITY
Start: 2025-06-21 | End: 1900-01-01